# Patient Record
Sex: MALE | Race: WHITE | NOT HISPANIC OR LATINO | Employment: OTHER | URBAN - METROPOLITAN AREA
[De-identification: names, ages, dates, MRNs, and addresses within clinical notes are randomized per-mention and may not be internally consistent; named-entity substitution may affect disease eponyms.]

---

## 2017-09-20 ENCOUNTER — OFFICE VISIT (OUTPATIENT)
Dept: CARDIOLOGY | Facility: CLINIC | Age: 72
End: 2017-09-20

## 2017-09-20 VITALS
SYSTOLIC BLOOD PRESSURE: 124 MMHG | OXYGEN SATURATION: 100 % | HEIGHT: 73 IN | DIASTOLIC BLOOD PRESSURE: 82 MMHG | WEIGHT: 202 LBS | HEART RATE: 72 BPM | BODY MASS INDEX: 26.77 KG/M2

## 2017-09-20 DIAGNOSIS — I25.10 CORONARY ARTERY DISEASE INVOLVING NATIVE CORONARY ARTERY OF NATIVE HEART WITHOUT ANGINA PECTORIS: Primary | ICD-10-CM

## 2017-09-20 DIAGNOSIS — Z95.1 S/P CABG (CORONARY ARTERY BYPASS GRAFT): ICD-10-CM

## 2017-09-20 PROBLEM — E11.9 DIABETES (HCC): Status: ACTIVE | Noted: 2017-09-20

## 2017-09-20 PROBLEM — I10 BP (HIGH BLOOD PRESSURE): Status: ACTIVE | Noted: 2017-09-20

## 2017-09-20 PROBLEM — E78.5 HYPERLIPIDEMIA: Status: ACTIVE | Noted: 2017-09-20

## 2017-09-20 PROBLEM — K57.90 DIVERTICULOSIS: Status: ACTIVE | Noted: 2017-09-20

## 2017-09-20 PROBLEM — I10 HYPERTENSION: Status: ACTIVE | Noted: 2017-09-20

## 2017-09-20 PROBLEM — E11.9 DIABETES MELLITUS (HCC): Status: ACTIVE | Noted: 2017-09-20

## 2017-09-20 PROCEDURE — 93000 ELECTROCARDIOGRAM COMPLETE: CPT | Performed by: PHYSICIAN ASSISTANT

## 2017-09-20 PROCEDURE — 99213 OFFICE O/P EST LOW 20 MIN: CPT | Performed by: PHYSICIAN ASSISTANT

## 2017-09-20 NOTE — PROGRESS NOTES
Date of Office Visit: 2017  Encounter Provider: MAGGIE Nash  Place of Service: [facility]  Patient Name: Elias Raza  :1945    Chief Complaint   Patient presents with   • Coronary Artery Disease     1 year follow up   :     HPI: Elias Raza is a 72 y.o. male , new to me, who presents today for follow-up.  Old records have been obtained and reviewed by me.He is a patient of Dr. Maurice's with a past medical history significant for diabetes, hypertension, and coronary artery disease.  He is status post CABG ×6 in .  He also has a chronic right bundle branch block.  His last stress test was in , and showed no ischemia and a large inferior lateral infarct.  He was last in our office to see Dr. Maurice on 2016.  At that visit, he was doing well.  Dr. Maurice noted that the EF on the stress test was 30%, and he rechecked an echocardiogram for confirmation.  This showed normal LV function with an EF of 50% and no significant valvular abnormalities.  He is here today for yearly follow-up.   Over the past year he's been doing well.  He has a few episodes of chest pain, however this is stable for him.  He exercises 3 days a week doing weights and cardiovascular.  He is able to do this without difficulty.  He does not do much on his off days.  He eats a relatively healthy diet, and is compliant with his medications.    Past Medical History:   Diagnosis Date   • CAD (coronary artery disease)    • Diabetes mellitus    • Hypertension    • Old myocardial infarct        Past Surgical History:   Procedure Laterality Date   • CORONARY ARTERY BYPASS GRAFT  2009:LIMA to LAD, SVG to RI, SVG to OM2, SVG to RAY, SVG to RV and PDA; inflat MI no ischemia on stress in    • TOOTH EXTRACTION         Social History     Social History   • Marital status:      Spouse name: N/A   • Number of children: N/A   • Years of education: N/A      Occupational History   • Not on file.     Social History Main Topics   • Smoking status: Never Smoker   • Smokeless tobacco: Never Used   • Alcohol use No   • Drug use: No   • Sexual activity: Defer     Other Topics Concern   • Not on file     Social History Narrative       Family History   Problem Relation Age of Onset   • No Known Problems Mother    • No Known Problems Father    • No Known Problems Maternal Grandmother    • No Known Problems Maternal Grandfather    • No Known Problems Paternal Grandmother    • No Known Problems Paternal Grandfather        Review of Systems   Constitution: Negative for chills, fever, malaise/fatigue, weight gain and weight loss.   HENT: Negative for ear pain, hearing loss, nosebleeds and sore throat.    Eyes: Negative for double vision, pain and visual disturbance.   Cardiovascular: Positive for chest pain. Negative for dyspnea on exertion, irregular heartbeat, leg swelling, near-syncope, orthopnea, palpitations, paroxysmal nocturnal dyspnea and syncope.   Respiratory: Negative for cough, shortness of breath, sleep disturbances due to breathing, snoring and wheezing.    Endocrine: Negative for cold intolerance, heat intolerance and polyuria.   Skin: Negative for itching and rash.   Musculoskeletal: Negative for joint pain, joint swelling and myalgias.   Gastrointestinal: Negative for abdominal pain, diarrhea, melena, nausea and vomiting.   Genitourinary: Negative for frequency, hematuria and hesitancy.   Neurological: Negative for excessive daytime sleepiness, headaches, light-headedness, numbness, paresthesias and seizures.   Psychiatric/Behavioral: Negative for altered mental status and depression.   Allergic/Immunologic: Negative.    All other systems reviewed and are negative.      No Known Allergies      Current Outpatient Prescriptions:   •  aspirin 81 MG tablet, Take 81 mg by mouth Daily., Disp: , Rfl:   •  azelastine (ASTELIN) 0.1 % nasal spray, 2 sprays into each  "nostril Daily., Disp: , Rfl:   •  fluticasone (FLONASE) 50 MCG/ACT nasal spray, 2 sprays into each nostril Daily., Disp: , Rfl:   •  glipiZIDE (GLUCOTROL) 5 MG ER tablet, Take 5 mg by mouth 2 (Two) Times a Day., Disp: , Rfl:   •  lisinopril (PRINIVIL,ZESTRIL) 10 MG tablet, Take 1 tablet by mouth daily., Disp: 90 tablet, Rfl: 3  •  METFORMIN HCL ER PO, Take 1,000 mg by mouth 2 (two) times a day., Disp: , Rfl:   •  metoprolol tartrate (LOPRESSOR) 25 MG tablet, TAKE 1 TABLET BY MOUTH 2 (TWO) TIMES A DAY., Disp: 180 tablet, Rfl: 3  •  Multiple Vitamin tablet, Take 1 tablet by mouth Daily., Disp: , Rfl:   •  rosuvastatin (CRESTOR) 10 MG tablet, Take 10 mg by mouth Daily., Disp: , Rfl:      Objective:     Vitals:    09/20/17 0855 09/20/17 0919   BP: 120/80 124/82   BP Location: Right arm Left arm   Pulse: 72    SpO2: 100%    Weight: 202 lb (91.6 kg)    Height: 73\" (185.4 cm)      Body mass index is 26.65 kg/(m^2).    PHYSICAL EXAM:    Physical Exam   Constitutional: He is oriented to person, place, and time. He appears well-developed and well-nourished. No distress.   HENT:   Head: Normocephalic and atraumatic.   Eyes: Pupils are equal, round, and reactive to light.   Neck: No JVD present. No thyromegaly present.   Cardiovascular: Normal rate, regular rhythm, normal heart sounds and intact distal pulses.    No murmur heard.  Pulmonary/Chest: Effort normal and breath sounds normal. No respiratory distress.   Abdominal: Soft. Bowel sounds are normal. He exhibits no distension. There is no splenomegaly or hepatomegaly. There is no tenderness.   Musculoskeletal: Normal range of motion. He exhibits no edema.   Neurological: He is alert and oriented to person, place, and time.   Skin: Skin is warm and dry. He is not diaphoretic. No erythema.   Psychiatric: He has a normal mood and affect. His behavior is normal. Judgment normal.         ECG 12 Lead  Date/Time: 9/20/2017 9:31 AM  Performed by: CARL SHI  Authorized by: " CARL GANDHI   Comparison: compared with previous ECG from 8/29/2016  Similar to previous ECG  Rhythm: sinus rhythm  BPM: 72  Conduction: right bundle branch block  T depression: III and aVF  Q waves: III and aVF  Clinical impression: abnormal ECG  Comments: Indication: Coronary artery disease, status post CABG.              Assessment:       Diagnosis Plan   1. Coronary artery disease involving native coronary artery of native heart without angina pectoris  ECG 12 Lead   2. S/P CABG (coronary artery bypass graft)  ECG 12 Lead     Orders Placed This Encounter   Procedures   • ECG 12 Lead     This order was created via procedure documentation          Plan:       1.  Coronary Artery Disease  Assessment  • The patient has no angina    Plan  • Lifestyle modifications discussed include adhering to a heart healthy diet, medication compliance and regular exercise    Subjective - Objective  • There is a history of past MI  • There is a history of previous coronary artery bypass graft  • Current antiplatelet therapy includes aspirin 81 mg  • Overall he's doing great.  He has a few episodes of chest pain, however this is normal for him and stable.  He is able to exercise without difficulty.  I'm not going to make any changes to his medical regimen today.  He will follow-up with Dr. Maurice in 1 year or sooner if needed.      As always, it has been a pleasure to participate in your patient's care.      Sincerely,         Carl Gandhi PA-C

## 2017-11-10 RX ORDER — LISINOPRIL 10 MG/1
TABLET ORAL
Qty: 90 TABLET | Refills: 3 | Status: SHIPPED | OUTPATIENT
Start: 2017-11-10 | End: 2018-10-22 | Stop reason: SDUPTHER

## 2017-12-11 ENCOUNTER — HOSPITAL ENCOUNTER (EMERGENCY)
Facility: HOSPITAL | Age: 72
Discharge: HOME | End: 2017-12-11
Attending: EMERGENCY MEDICINE | Admitting: EMERGENCY MEDICINE

## 2017-12-11 ENCOUNTER — APPOINTMENT (OUTPATIENT)
Dept: GENERAL RADIOLOGY | Facility: HOSPITAL | Age: 72
End: 2017-12-11

## 2017-12-11 VITALS
HEART RATE: 78 BPM | DIASTOLIC BLOOD PRESSURE: 78 MMHG | RESPIRATION RATE: 16 BRPM | WEIGHT: 205 LBS | SYSTOLIC BLOOD PRESSURE: 150 MMHG | TEMPERATURE: 97.7 F | OXYGEN SATURATION: 94 % | BODY MASS INDEX: 27.17 KG/M2 | HEIGHT: 73 IN

## 2017-12-11 DIAGNOSIS — R07.89 ATYPICAL CHEST PAIN: Primary | ICD-10-CM

## 2017-12-11 LAB
ALBUMIN SERPL-MCNC: 4.2 G/DL (ref 3.5–5.2)
ALBUMIN/GLOB SERPL: 1.2 G/DL
ALP SERPL-CCNC: 84 U/L (ref 39–117)
ALT SERPL W P-5'-P-CCNC: 18 U/L (ref 1–41)
ANION GAP SERPL CALCULATED.3IONS-SCNC: 9.5 MMOL/L
AST SERPL-CCNC: 17 U/L (ref 1–40)
BASOPHILS # BLD AUTO: 0.02 10*3/MM3 (ref 0–0.2)
BASOPHILS NFR BLD AUTO: 0.2 % (ref 0–1.5)
BILIRUB SERPL-MCNC: 0.4 MG/DL (ref 0.1–1.2)
BUN BLD-MCNC: 16 MG/DL (ref 8–23)
BUN/CREAT SERPL: 15.2 (ref 7–25)
CALCIUM SPEC-SCNC: 9.3 MG/DL (ref 8.6–10.5)
CHLORIDE SERPL-SCNC: 102 MMOL/L (ref 98–107)
CO2 SERPL-SCNC: 28.5 MMOL/L (ref 22–29)
CREAT BLD-MCNC: 1.05 MG/DL (ref 0.76–1.27)
DEPRECATED RDW RBC AUTO: 45.4 FL (ref 37–54)
EOSINOPHIL # BLD AUTO: 0.17 10*3/MM3 (ref 0–0.7)
EOSINOPHIL NFR BLD AUTO: 1.9 % (ref 0.3–6.2)
ERYTHROCYTE [DISTWIDTH] IN BLOOD BY AUTOMATED COUNT: 13.2 % (ref 11.5–14.5)
GFR SERPL CREATININE-BSD FRML MDRD: 69 ML/MIN/1.73
GLOBULIN UR ELPH-MCNC: 3.4 GM/DL
GLUCOSE BLD-MCNC: 113 MG/DL (ref 65–99)
HCT VFR BLD AUTO: 47.5 % (ref 40.4–52.2)
HGB BLD-MCNC: 15.2 G/DL (ref 13.7–17.6)
HOLD SPECIMEN: NORMAL
IMM GRANULOCYTES # BLD: 0.03 10*3/MM3 (ref 0–0.03)
IMM GRANULOCYTES NFR BLD: 0.3 % (ref 0–0.5)
LYMPHOCYTES # BLD AUTO: 2.24 10*3/MM3 (ref 0.9–4.8)
LYMPHOCYTES NFR BLD AUTO: 25 % (ref 19.6–45.3)
MCH RBC QN AUTO: 30.5 PG (ref 27–32.7)
MCHC RBC AUTO-ENTMCNC: 32 G/DL (ref 32.6–36.4)
MCV RBC AUTO: 95.4 FL (ref 79.8–96.2)
MONOCYTES # BLD AUTO: 0.69 10*3/MM3 (ref 0.2–1.2)
MONOCYTES NFR BLD AUTO: 7.7 % (ref 5–12)
NEUTROPHILS # BLD AUTO: 5.82 10*3/MM3 (ref 1.9–8.1)
NEUTROPHILS NFR BLD AUTO: 64.9 % (ref 42.7–76)
PLATELET # BLD AUTO: 221 10*3/MM3 (ref 140–500)
PMV BLD AUTO: 10.7 FL (ref 6–12)
POTASSIUM BLD-SCNC: 4.7 MMOL/L (ref 3.5–5.2)
PROT SERPL-MCNC: 7.6 G/DL (ref 6–8.5)
RBC # BLD AUTO: 4.98 10*6/MM3 (ref 4.6–6)
SODIUM BLD-SCNC: 140 MMOL/L (ref 136–145)
TROPONIN T SERPL-MCNC: <0.01 NG/ML (ref 0–0.03)
TROPONIN T SERPL-MCNC: <0.01 NG/ML (ref 0–0.03)
WBC NRBC COR # BLD: 8.97 10*3/MM3 (ref 4.5–10.7)
WHOLE BLOOD HOLD SPECIMEN: NORMAL

## 2017-12-11 PROCEDURE — 71020 HC CHEST PA AND LATERAL: CPT

## 2017-12-11 PROCEDURE — 93005 ELECTROCARDIOGRAM TRACING: CPT

## 2017-12-11 PROCEDURE — 84484 ASSAY OF TROPONIN QUANT: CPT | Performed by: EMERGENCY MEDICINE

## 2017-12-11 PROCEDURE — 85025 COMPLETE CBC W/AUTO DIFF WBC: CPT | Performed by: EMERGENCY MEDICINE

## 2017-12-11 PROCEDURE — 80053 COMPREHEN METABOLIC PANEL: CPT | Performed by: EMERGENCY MEDICINE

## 2017-12-11 PROCEDURE — 36415 COLL VENOUS BLD VENIPUNCTURE: CPT | Performed by: EMERGENCY MEDICINE

## 2017-12-11 PROCEDURE — 93010 ELECTROCARDIOGRAM REPORT: CPT | Performed by: INTERNAL MEDICINE

## 2017-12-11 PROCEDURE — 99283 EMERGENCY DEPT VISIT LOW MDM: CPT

## 2017-12-11 RX ORDER — ASPIRIN 325 MG
325 TABLET ORAL ONCE
Status: DISCONTINUED | OUTPATIENT
Start: 2017-12-11 | End: 2017-12-11

## 2017-12-11 RX ORDER — SODIUM CHLORIDE 0.9 % (FLUSH) 0.9 %
10 SYRINGE (ML) INJECTION AS NEEDED
Status: DISCONTINUED | OUTPATIENT
Start: 2017-12-11 | End: 2017-12-11 | Stop reason: HOSPADM

## 2017-12-11 NOTE — ED PROVIDER NOTES
EMERGENCY DEPARTMENT ENCOUNTER    CHIEF COMPLAINT  Chief Complaint: Chest Pain  History given by: Patient  History limited by:   Room Number: 17/17  PMD: Sb Del Rio DO  Cardiologist: Dr. Maurice    HPI:  Pt is a 72 y.o. male who presents complaining of intermittent chest pain that onset 3 days ago. Pt reports the pain is in the mid-back and radiates to the lower chest. He has taken ASA without relief. Pt has a hx of CAD and previous CABG. He has had similar CP in the past that resolved. He denies any recent illness, fever, chills, abd pain, N/V, diaphoresis, or SOA. He states that pain is worse in certain positions.    Duration: 3 days  Onset: sudden  Timing: constant  Location: mid-back  Radiation: lower chest  Quality: soreness  Intensity/Severity: moderate  Progression: unchanged  Associated Symptoms: none  Aggravating Factors: certain positions  Alleviating Factors: none  Previous Episodes: hx of CAD and CABG. Similar pain has normally resolved  Treatment before arrival: took ASA without relief    PAST MEDICAL HISTORY  Active Ambulatory Problems     Diagnosis Date Noted   • Old inferior wall myocardial infarction 08/29/2016   • RBBB (right bundle branch block) 08/29/2016   • CAD (coronary artery disease) 08/29/2016   • S/P CABG (coronary artery bypass graft) 08/29/2016   • Abnormal EKG 08/09/2013   • Carotid artery stenosis 08/09/2013   • Diabetes 09/20/2017   • Diabetes mellitus 09/20/2017   • Diverticulosis 09/20/2017   • BP (high blood pressure) 09/20/2017   • Hyperlipidemia 09/20/2017   • Hypertension 09/20/2017   • PVC (premature ventricular contraction) 08/09/2013   • Type 2 diabetes mellitus with hemoglobin A1c goal of less than 7.0% 12/18/2015     Resolved Ambulatory Problems     Diagnosis Date Noted   • No Resolved Ambulatory Problems     Past Medical History:   Diagnosis Date   • CAD (coronary artery disease)    • Diabetes mellitus    • Hypertension    • Old myocardial infarct        PAST SURGICAL  HISTORY  Past Surgical History:   Procedure Laterality Date   • CORONARY ARTERY BYPASS GRAFT  2009    2009:LIMA to LAD, SVG to RI, SVG to OM2, SVG to RAY, SVG to RV and PDA; inflat MI no ischemia on stress in 2015   • TOOTH EXTRACTION         FAMILY HISTORY  Family History   Problem Relation Age of Onset   • No Known Problems Mother    • No Known Problems Father    • No Known Problems Maternal Grandmother    • No Known Problems Maternal Grandfather    • No Known Problems Paternal Grandmother    • No Known Problems Paternal Grandfather        SOCIAL HISTORY  Social History     Social History   • Marital status:      Spouse name: N/A   • Number of children: N/A   • Years of education: N/A     Occupational History   • Not on file.     Social History Main Topics   • Smoking status: Never Smoker   • Smokeless tobacco: Never Used   • Alcohol use No   • Drug use: No   • Sexual activity: Defer     Other Topics Concern   • Not on file     Social History Narrative       ALLERGIES  Review of patient's allergies indicates no known allergies.    REVIEW OF SYSTEMS  Review of Systems   Constitutional: Negative for activity change, appetite change and fever.   HENT: Negative for congestion and sore throat.    Eyes: Negative.    Respiratory: Negative for cough and shortness of breath.    Cardiovascular: Positive for chest pain. Negative for leg swelling.   Gastrointestinal: Negative for abdominal pain, diarrhea and vomiting.   Endocrine: Negative.    Genitourinary: Negative for decreased urine volume and dysuria.   Musculoskeletal: Negative for neck pain.   Skin: Negative for rash and wound.   Allergic/Immunologic: Negative.    Neurological: Negative for weakness, numbness and headaches.   Hematological: Negative.    Psychiatric/Behavioral: Negative.    All other systems reviewed and are negative.      PHYSICAL EXAM  ED Triage Vitals   Temp Heart Rate Resp BP SpO2   12/11/17 1252 12/11/17 1252 12/11/17 1255 12/11/17 1258  12/11/17 1252   97.7 °F (36.5 °C) 90 16 182/80 98 %      Temp src Heart Rate Source Patient Position BP Location FiO2 (%)   12/11/17 1252 12/11/17 1252 12/11/17 1258 12/11/17 1258 --   Tympanic Monitor Sitting Right arm        Physical Exam   Constitutional: He is oriented to person, place, and time and well-developed, well-nourished, and in no distress.   HENT:   Head: Normocephalic and atraumatic.   Eyes: EOM are normal. Pupils are equal, round, and reactive to light.   Neck: Normal range of motion. Neck supple.   Cardiovascular: Normal rate, regular rhythm and normal heart sounds.    Pulmonary/Chest: Effort normal and breath sounds normal. No respiratory distress.   Abdominal: Soft. There is no tenderness. There is no rebound and no guarding.   Musculoskeletal: Normal range of motion. He exhibits no edema.   Neurological: He is alert and oriented to person, place, and time. He has normal sensation and normal strength.   Skin: Skin is warm and dry.   Psychiatric: Mood and affect normal.   Nursing note and vitals reviewed.      LAB RESULTS  Lab Results (last 24 hours)     Procedure Component Value Units Date/Time    CBC & Differential [[id number]] Collected:  12/11/17 1513    Specimen:  Blood Updated:  12/11/17 1609    Narrative:       The following orders were created for panel order CBC & Differential.  Procedure                               Abnormality         Status                     ---------                               -----------         ------                     CBC Auto Differential[[id number]]        Abnormal            Final result                 Please view results for these tests on the individual orders.    Comprehensive Metabolic Panel [[id number]]  (Abnormal) Collected:  12/11/17 1513    Specimen:  Blood Updated:  12/11/17 1619     Glucose 113 (H) mg/dL      BUN 16 mg/dL      Creatinine 1.05 mg/dL      Sodium 140 mmol/L      Potassium 4.7 mmol/L      Chloride 102 mmol/L      CO2 28.5 mmol/L       Calcium 9.3 mg/dL      Total Protein 7.6 g/dL      Albumin 4.20 g/dL      ALT (SGPT) 18 U/L      AST (SGOT) 17 U/L      Alkaline Phosphatase 84 U/L      Total Bilirubin 0.4 mg/dL      eGFR Non African Amer 69 mL/min/1.73      Globulin 3.4 gm/dL      A/G Ratio 1.2 g/dL      BUN/Creatinine Ratio 15.2     Anion Gap 9.5 mmol/L     Narrative:       The MDRD GFR formula is only valid for adults with stable renal function between ages 18 and 70.    Troponin [[id number]]  (Normal) Collected:  12/11/17 1513    Specimen:  Blood Updated:  12/11/17 1619     Troponin T <0.010 ng/mL     Narrative:       Troponin T Reference Ranges:  Less than 0.03 ng/mL:    Negative for AMI  0.03 to 0.09 ng/mL:      Indeterminant for AMI  Greater than 0.09 ng/mL: Positive for AMI    CBC Auto Differential [[id number]]  (Abnormal) Collected:  12/11/17 1513    Specimen:  Blood Updated:  12/11/17 1609     WBC 8.97 10*3/mm3      RBC 4.98 10*6/mm3      Hemoglobin 15.2 g/dL      Hematocrit 47.5 %      MCV 95.4 fL      MCH 30.5 pg      MCHC 32.0 (L) g/dL      RDW 13.2 %      RDW-SD 45.4 fl      MPV 10.7 fL      Platelets 221 10*3/mm3      Neutrophil % 64.9 %      Lymphocyte % 25.0 %      Monocyte % 7.7 %      Eosinophil % 1.9 %      Basophil % 0.2 %      Immature Grans % 0.3 %      Neutrophils, Absolute 5.82 10*3/mm3      Lymphocytes, Absolute 2.24 10*3/mm3      Monocytes, Absolute 0.69 10*3/mm3      Eosinophils, Absolute 0.17 10*3/mm3      Basophils, Absolute 0.02 10*3/mm3      Immature Grans, Absolute 0.03 10*3/mm3     Troponin [[id number]]  (Normal) Collected:  12/11/17 1723    Specimen:  Blood from Arm, Left Updated:  12/11/17 1758     Troponin T <0.010 ng/mL     Narrative:       Troponin T Reference Ranges:  Less than 0.03 ng/mL:    Negative for AMI  0.03 to 0.09 ng/mL:      Indeterminant for AMI  Greater than 0.09 ng/mL: Positive for AMI          I ordered the above labs and reviewed the results    RADIOLOGY  XR Chest 2 View   Preliminary result by  Interface, Rad Results [place] In (12/11/17 16:32:30)     Narrative:     TWO-VIEW CHEST     HISTORY: Anterior chest pain and shortness of breath.     FINDINGS:  There is some vague haziness in the lower left chest in part  related to a small amount of pleural fluid extending along the left  lateral chest wall and combined with possible developing infiltrate that  is new since the study of 10/19/2011. The heart remains slightly  enlarged with sternal wires from previous cardiac surgery.                   I ordered the above noted radiological studies. Interpreted by radiologist. Reviewed by me in PACS.       PROCEDURES  Procedures  EKG           EKG time: 1256  Rhythm/Rate: NSR, 85BPM  P waves and CT: nml  QRS, axis: nml axis, RBBB   ST and T waves: nml     Interpreted Contemporaneously by me, independently viewed  unchanged compared to prior 9/19/11      PROGRESS AND CONSULTS  ED Course   6:00 PM  Rechecked with pt. Informed the pt of his negative repeat troponin and plan to discharge. Instructed to follow-up with cardiologist for further evaluation if needed. Pt understands and agrees with plan. All concerns addressed.         MEDICAL DECISION MAKING  Results were reviewed/discussed with the patient and they were also made aware of online access. Pt also made aware that some labs, such as cultures, will not be resulted during ER visit and follow up with PMD is necessary.     MDM  Number of Diagnoses or Management Options     Amount and/or Complexity of Data Reviewed  Clinical lab tests: ordered and reviewed  Tests in the radiology section of CPT®: ordered and reviewed  Tests in the medicine section of CPT®: reviewed and ordered (EKG - See EKG procedure note)  Review and summarize past medical records: yes (Seen by cardiologist 11/20/17 for yearly follow-up. No changes at that time. )           DIAGNOSIS  Final diagnoses:   Atypical chest pain       DISPOSITION  DISCHARGE    Patient discharged in stable  condition.    Reviewed implications of results, diagnosis, meds, responsibility to follow up, warning signs and symptoms of possible worsening, potential complications and reasons to return to ER.    Patient/Family voiced understanding of above instructions.    Discussed plan for discharge, as there is no emergent indication for admission.  Pt/family is agreeable and understands need for follow up and repeat testing.  Pt is aware that discharge does not mean that nothing is wrong but it indicates no emergency is present that requires admission and they must continue care with follow-up as given below or physician of their choice.     FOLLOW-UP  Stanislav Maurice MD  [address] [id number] [phone]    Call in 1 day  For cardiologist follow-up, If symptoms worsen         Medication List      Notice     No changes were made to your prescriptions during this visit.            Latest Documented Vital Signs:  As of 6:02 PM  BP- 177/76 HR- 84 Temp- 97.7 °F (36.5 °C) (Tympanic) O2 sat- 96%    --  Documentation assistance provided by kt Guzmán for Dr. Hurtado.  Information recorded by the scribe was done at my direction and has been verified and validated by me.     Lei Guzmán  12/11/17 8897       Cale Hurtado MD  12/11/17 9222

## 2018-09-21 ENCOUNTER — OFFICE VISIT (OUTPATIENT)
Dept: CARDIOLOGY | Facility: CLINIC | Age: 73
End: 2018-09-21

## 2018-09-21 VITALS
BODY MASS INDEX: 25.84 KG/M2 | WEIGHT: 195 LBS | DIASTOLIC BLOOD PRESSURE: 88 MMHG | SYSTOLIC BLOOD PRESSURE: 144 MMHG | HEART RATE: 72 BPM | HEIGHT: 73 IN

## 2018-09-21 DIAGNOSIS — I45.10 RBBB (RIGHT BUNDLE BRANCH BLOCK): ICD-10-CM

## 2018-09-21 DIAGNOSIS — I10 HYPERTENSION, UNSPECIFIED TYPE: ICD-10-CM

## 2018-09-21 DIAGNOSIS — Z95.1 S/P CABG (CORONARY ARTERY BYPASS GRAFT): ICD-10-CM

## 2018-09-21 DIAGNOSIS — E78.5 HYPERLIPIDEMIA, UNSPECIFIED HYPERLIPIDEMIA TYPE: ICD-10-CM

## 2018-09-21 DIAGNOSIS — I25.2 OLD INFERIOR WALL MYOCARDIAL INFARCTION: ICD-10-CM

## 2018-09-21 DIAGNOSIS — I25.10 CORONARY ARTERY DISEASE INVOLVING NATIVE CORONARY ARTERY OF NATIVE HEART WITHOUT ANGINA PECTORIS: Primary | ICD-10-CM

## 2018-09-21 PROBLEM — R80.1 PERSISTENT PROTEINURIA: Status: ACTIVE | Noted: 2018-05-24

## 2018-09-21 PROBLEM — Z12.11 SCREEN FOR COLON CANCER: Status: ACTIVE | Noted: 2018-02-19

## 2018-09-21 PROBLEM — Z86.010 HX OF COLONIC POLYPS: Status: ACTIVE | Noted: 2018-02-19

## 2018-09-21 PROBLEM — E11.21 DIABETIC NEPHROPATHY ASSOCIATED WITH TYPE 2 DIABETES MELLITUS (HCC): Status: ACTIVE | Noted: 2018-08-23

## 2018-09-21 PROBLEM — Z86.0100 HX OF COLONIC POLYPS: Status: ACTIVE | Noted: 2018-02-19

## 2018-09-21 PROBLEM — J30.1 SEASONAL ALLERGIC RHINITIS DUE TO POLLEN: Status: ACTIVE | Noted: 2018-05-24

## 2018-09-21 PROCEDURE — 93000 ELECTROCARDIOGRAM COMPLETE: CPT | Performed by: NURSE PRACTITIONER

## 2018-09-21 PROCEDURE — 99214 OFFICE O/P EST MOD 30 MIN: CPT | Performed by: NURSE PRACTITIONER

## 2018-09-21 RX ORDER — LANCETS
EACH MISCELLANEOUS
COMMUNITY
Start: 2017-09-25

## 2018-09-21 RX ORDER — AZELASTINE 1 MG/ML
SPRAY, METERED NASAL
COMMUNITY
Start: 2018-05-29 | End: 2020-09-22 | Stop reason: SDUPTHER

## 2018-09-21 RX ORDER — GLIPIZIDE 5 MG/1
5 TABLET, FILM COATED, EXTENDED RELEASE ORAL
COMMUNITY
Start: 2018-08-23

## 2018-09-21 RX ORDER — ROSUVASTATIN CALCIUM 10 MG/1
TABLET, COATED ORAL
COMMUNITY
Start: 2018-07-30

## 2018-09-21 NOTE — PROGRESS NOTES
Date of Office Visit: 2018  Encounter Provider: ARLIN Ford  Place of Service: [facility] CARDIOLOGY  Patient Name: Elias Raza  :1945      Subjective:     Chief Complaint: CAD, HTN    History of Present Illness:  Elias Raza is a pleasant 73 y.o. male who is new to me.  Outside records have been obtained and reviewed by me.  The patient has a past medical history of coronary artery disease, old myocardial infarct, coronary artery bypass grafting x 6 in , hypertension, chronic RBBB, and diabetes.      His last stress test was in  and showed no ischemia and a large inferior lateral infarct.  When he last saw Dr. Maurice on 16 Dr. Maurice noted that his ejection fraction on a stress test was 30% and rechecked an echocardiogram for confirmation.  This echo showed normal LV function with an EF of 50% and no significant valvular abnormalities.     He was last seen in the office by Bibi Morel on 17 for a yearly follow-up.  This visit he had overall been doing pretty well.  He has had a few episodes of chest pain, however this was stable for him.  He reports that he exercises 3 days a week doing weights and cardiovascular exercise and is able to do this without difficulty.  He does not do much on his off days.  He stated at that time that he eats a relatively healthy diet, and is compliant with his medications.    The patient presented to the emergency department on 17 with intermittent chest pain which started about 3 days prior.  The patient reports the pain was in the mid back and radiates to the lower chest.  He has taken aspirin without relief.  Has had similar chest pain in the past that have resolved.  He states that the pain is worse in certain positions.  He denied any recent illnesses, fever, chills and abdominal pain, nausea or vomiting, diaphoresis or shortness of breath.  The patient had 2 negative troponins, his EKG showed  normal sinus rhythm with a right bundle branch block.  He was discharged home and advised to follow-up with his cardiologist, which I do not see where he followed up with us after this episode.  He was a little hypertensive in the ED with a BP of 177-182/76-80.  All other vital signs appeared stable.  His labs were essentially normal ride from a mildly elevated glucose level of 113.      His CMP on 17 was within normal limits aside from a slightly elevated glucose level of 113 (99). LFTs were WNL. His last lipid panel I see was 4/10/17: His LDL was 38, HDL 35 and triglycerides were elevated at 180, and Total cholesterol was 119.      Her is here today for his yearly follow-up. Overall the patient reports he is feeling well. He reports that every once in a while he gets chest discomfort which is described as a shooting pain that is aggravated by heavy lifting or heavy yard work that lasts only a couple seconds. He gets it in both arms occasionally and sometimes it rotates between arms.  He reports he goes to the gym 3 times a week and does an hour of light cardio getting his HR up to about 110s to 115s  and some lifting.  He does not have any chest pain or shortness of breath with his gym activities. He also reports some mild dizziness with fast movements.  He denies any syncope, near syncope or falls.  He denies any PND, orthopnea or swelling of the legs.  He reports that he does not check his blood pressure at home. He does state that his AM blood sugars have for the most part been running WNL 90-100s.    Procedures/Testin16 Echo    All left ventricular wall segments contract normally.  Left ventricular function is normal. Estimated EF = 50%.  Left ventricular diastolic dysfunction (grade I) consistent with impaired relaxation.  The aortic valve is abnormal in structure. There is calcification of the noncoronary cusp present. Decreased movement. No aortic valve regurgitation is present. No aortic  valve stenosis is present.      Past Medical History:   Diagnosis Date   • CAD (coronary artery disease)    • Diabetes mellitus (CMS/HCC)    • Hypertension    • Old myocardial infarct      Past Surgical History:   Procedure Laterality Date   • CORONARY ARTERY BYPASS GRAFT  2009 2009:LIMA to LAD, SVG to RI, SVG to OM2, SVG to RAY, SVG to RV and PDA; inflat MI no ischemia on stress in 2015   • TOOTH EXTRACTION       Outpatient Medications Prior to Visit   Medication Sig Dispense Refill   • aspirin 81 MG tablet Take 81 mg by mouth Daily.     • azelastine (ASTELIN) 0.1 % nasal spray 2 sprays into each nostril Daily.     • fluticasone (FLONASE) 50 MCG/ACT nasal spray 2 sprays into each nostril Daily.     • glipiZIDE (GLUCOTROL) 5 MG ER tablet Take 5 mg by mouth Daily.     • lisinopril (PRINIVIL,ZESTRIL) 10 MG tablet TAKE 1 TABLET BY MOUTH DAILY. 90 tablet 3   • METFORMIN HCL ER PO Take 1,000 mg by mouth 2 (two) times a day.     • metoprolol tartrate (LOPRESSOR) 25 MG tablet TAKE 1 TABLET BY MOUTH 2 (TWO) TIMES A DAY. 180 tablet 0   • Multiple Vitamin tablet Take 1 tablet by mouth Daily.     • rosuvastatin (CRESTOR) 10 MG tablet Take 10 mg by mouth Daily.     • azithromycin (ZITHROMAX Z-FLAQUITA) 250 MG tablet Take 2 tablets at the same time on Day 1 and then 1 tablet every 24 hours thereafter. 6 tablet 0     No facility-administered medications prior to visit.        Allergies as of 09/21/2018   • (No Known Allergies)     Social History     Social History   • Marital status:      Spouse name: N/A   • Number of children: N/A   • Years of education: N/A     Occupational History   • Not on file.     Social History Main Topics   • Smoking status: Former Smoker   • Smokeless tobacco: Never Used      Comment: 20 years ago   • Alcohol use No   • Drug use: No   • Sexual activity: Defer     Other Topics Concern   • Not on file     Social History Narrative   • No narrative on file     Family History   Problem Relation Age of  "Onset   • No Known Problems Mother    • No Known Problems Father    • No Known Problems Maternal Grandmother    • No Known Problems Maternal Grandfather    • No Known Problems Paternal Grandmother    • No Known Problems Paternal Grandfather      Review of Systems   Constitution: Negative for chills, fever and malaise/fatigue.   HENT: Negative for ear pain, hearing loss, nosebleeds and sore throat.    Eyes: Negative for double vision, pain, vision loss in left eye and vision loss in right eye.   Cardiovascular: Negative for chest pain, claudication, dyspnea on exertion, irregular heartbeat, leg swelling, near-syncope, orthopnea, palpitations, paroxysmal nocturnal dyspnea and syncope.   Respiratory: Negative for cough, shortness of breath, snoring and wheezing.    Endocrine: Negative for cold intolerance and heat intolerance.   Hematologic/Lymphatic: Negative for bleeding problem.   Skin: Negative for color change, itching, rash and unusual hair distribution.   Musculoskeletal: Negative for joint pain and joint swelling.   Gastrointestinal: Negative for abdominal pain, diarrhea, hematochezia, melena, nausea and vomiting.   Genitourinary: Negative for decreased libido, frequency, hematuria, hesitancy and incomplete emptying.   Neurological: Positive for dizziness (occassional with fast movement). Negative for excessive daytime sleepiness, headaches, light-headedness, loss of balance, numbness, paresthesias and seizures.   Psychiatric/Behavioral: Negative for depression.          Objective:     Vitals:    09/21/18 0956   BP: 144/88   BP Location: Left arm   Patient Position: Sitting   Pulse: 72   Weight: 88.5 kg (195 lb)   Height: 185.4 cm (73\")     Body mass index is 25.73 kg/m².    PHYSICAL EXAM:  Physical Exam   Constitutional: He is oriented to person, place, and time. He appears well-developed and well-nourished. No distress.   HENT:   Head: Normocephalic and atraumatic.   Eyes: Pupils are equal, round, and " reactive to light.   Neck: Neck supple. No JVD present. Carotid bruit is not present.   Cardiovascular: Normal rate, regular rhythm, normal heart sounds and intact distal pulses.    No murmur heard.  Pulses:       Radial pulses are 2+ on the right side, and 2+ on the left side.        Dorsalis pedis pulses are 2+ on the right side, and 2+ on the left side.        Posterior tibial pulses are 2+ on the right side, and 2+ on the left side.   Pulmonary/Chest: Effort normal and breath sounds normal. No accessory muscle usage. No respiratory distress. He has no decreased breath sounds. He has no wheezes. He has no rhonchi. He has no rales. He exhibits no tenderness.   Abdominal: Soft. Bowel sounds are normal. He exhibits no distension. There is no splenomegaly or hepatomegaly. There is no tenderness.   Musculoskeletal: Normal range of motion. He exhibits no edema.   Neurological: He is alert and oriented to person, place, and time.   Skin: Skin is warm, dry and intact. He is not diaphoretic. No erythema.   Psychiatric: He has a normal mood and affect. His speech is normal and behavior is normal. Judgment and thought content normal. Cognition and memory are normal.   Nursing note and vitals reviewed.        ECG 12 Lead  Date/Time: 9/21/2018 10:11 AM  Performed by: BASILIA GARCIA  Authorized by: BASILIA GARCIA   Comparison: compared with previous ECG from 9/20/2017  Similar to previous ECG  Rhythm: sinus rhythm  Rate: normal  BPM: 72  Conduction: right bundle branch block  Clinical impression: abnormal ECG  Comments: Probable inferior infarct: age indeterminant- negative T -waves and Q waves in leads II, III and aVF  Indication: CAD              Assessment:       Diagnosis Plan   1. Coronary artery disease involving native coronary artery of native heart without angina pectoris  Stress Test With Myocardial Perfusion One Day   2. S/P CABG (coronary artery bypass graft)  Stress Test With Myocardial Perfusion One Day   3.  Old inferior wall myocardial infarction  Stress Test With Myocardial Perfusion One Day   4. RBBB (right bundle branch block)  Stress Test With Myocardial Perfusion One Day   5. Hyperlipidemia, unspecified hyperlipidemia type  Stress Test With Myocardial Perfusion One Day   6. Hypertension, unspecified type  Stress Test With Myocardial Perfusion One Day       Plan:     1.  Coronary Artery Disease  Assessment  • The patient has no angina  • S/p CABG x 6 in 2009    Subjective - Objective  • Current antiplatelet therapy includes aspirin 81 mg    s/p CABG x 6 in 2009.  His last stress test was in 2015.  We will repeat another next year    2. Hypertension: His b/p today is 144/88.  He reports occasional dizziness with fast movements. He does not check his blood pressure at home like he used to. He is on Metoprolol tartrate 25 mg twice daily, and Lisinopril 10 mg daily. I advised him to start checking his b/p at home to make sure it is not getting too high or low causing his dizziness. He will let us know if he has further issues.    3. Hyperlipidemia:  His LDL was 38, HDL 35 and triglycerides were elevated at 180, and Total cholesterol was 119.  On Crestor 10 mg daily.  He reports he had his cholesterol checked this year by his PCP and was told it was ok. I will defer to PCP for monitoring of lipid panel and titration of statin.    4. Right bundle branch block: Chronic    I advised on the importance of good blood pressure cholesterol and blood sugar control and for cardiovascular disease risk factor modification.       Follow up with Dr. Maurice in 1 year, unless otherwise needed sooner.  I advised the patient to contact our office with any questions or concerns.         Your medication list          Accurate as of 9/21/18 11:54 AM. If you have any questions, ask your nurse or doctor.               CONTINUE taking these medications      Instructions Last Dose Given Next Dose Due   ACCU-CHEK FASTCLIX LANCETS misc      Check  blood sugar daily fasting in a.m.       aspirin 81 MG tablet      Take 81 mg by mouth Daily.       azelastine 0.1 % nasal spray  Commonly known as:  ASTELIN      2 sprays into each nostril Daily.       azelastine 0.1 % nasal spray  Commonly known as:  ASTELIN      INSTILL 2 SPRAYS INTO EACH NOSTRIL TWICE A DAY       CRESTOR 10 MG tablet  Generic drug:  rosuvastatin      Take 10 mg by mouth Daily.       rosuvastatin 10 MG tablet  Commonly known as:  CRESTOR      TAKE 1 TABLET BY MOUTH DAILY       fluticasone 50 MCG/ACT nasal spray  Commonly known as:  FLONASE      2 sprays into each nostril Daily.       glipiZIDE 5 MG ER tablet  Commonly known as:  GLUCOTROL XL      Take 5 mg by mouth Daily.       glipiZIDE 5 MG ER tablet  Commonly known as:  GLUCOTROL XL      Take 5 mg by mouth.       lisinopril 10 MG tablet  Commonly known as:  PRINIVIL,ZESTRIL      TAKE 1 TABLET BY MOUTH DAILY.       METFORMIN HCL ER PO      Take 1,000 mg by mouth 2 (two) times a day.       metFORMIN 1000 MG tablet  Commonly known as:  GLUCOPHAGE      Take 1,000 mg by mouth.       metFORMIN 1000 MG tablet  Commonly known as:  GLUCOPHAGE           metoprolol tartrate 25 MG tablet  Commonly known as:  LOPRESSOR      TAKE 1 TABLET BY MOUTH 2 (TWO) TIMES A DAY.       Multiple Vitamin tablet      Take 1 tablet by mouth Daily.       ONETOUCH VERIO test strip  Generic drug:  glucose blood      1 strip by Other route.          STOP taking these medications    azithromycin 250 MG tablet  Commonly known as:  ZITHROMAX Z-FLAQUITA  Stopped by:  ARLIN Ford               The above medication changes may not have been made by this provider.  Medication list was updated to reflect medications patient is currently taking including medication changes and discontinuations made by other healthcare providers.       I have reviewed this case with Dr. Maurice. The patient was also evaluated by Dr. Maurice today as well. It has been a pleasure to participate in this  patient's care. Please feel free to contact me with any questions or concerns.     Carmita Guan, APRN  09/21/2018       Dictated utilizing Dragon Dictation System.

## 2018-10-22 RX ORDER — LISINOPRIL 10 MG/1
10 TABLET ORAL DAILY
Qty: 90 TABLET | Refills: 3 | Status: SHIPPED | OUTPATIENT
Start: 2018-10-22 | End: 2019-09-20 | Stop reason: SDUPTHER

## 2019-09-20 ENCOUNTER — HOSPITAL ENCOUNTER (OUTPATIENT)
Dept: CARDIOLOGY | Facility: HOSPITAL | Age: 74
Discharge: HOME | End: 2019-09-20
Admitting: NURSE PRACTITIONER

## 2019-09-20 ENCOUNTER — OFFICE VISIT (OUTPATIENT)
Dept: CARDIOLOGY | Facility: CLINIC | Age: 74
End: 2019-09-20

## 2019-09-20 VITALS — BODY MASS INDEX: 25.19 KG/M2 | HEIGHT: 73 IN | WEIGHT: 190.04 LBS

## 2019-09-20 VITALS
BODY MASS INDEX: 25.23 KG/M2 | HEIGHT: 73 IN | SYSTOLIC BLOOD PRESSURE: 130 MMHG | DIASTOLIC BLOOD PRESSURE: 82 MMHG | WEIGHT: 190.4 LBS | HEART RATE: 74 BPM

## 2019-09-20 DIAGNOSIS — I10 ESSENTIAL HYPERTENSION: ICD-10-CM

## 2019-09-20 DIAGNOSIS — E78.5 HYPERLIPIDEMIA, UNSPECIFIED HYPERLIPIDEMIA TYPE: ICD-10-CM

## 2019-09-20 DIAGNOSIS — E11.21 DIABETIC NEPHROPATHY ASSOCIATED WITH TYPE 2 DIABETES MELLITUS (HCC): ICD-10-CM

## 2019-09-20 DIAGNOSIS — I45.10 RBBB (RIGHT BUNDLE BRANCH BLOCK): ICD-10-CM

## 2019-09-20 DIAGNOSIS — I25.2 OLD INFERIOR WALL MYOCARDIAL INFARCTION: Primary | ICD-10-CM

## 2019-09-20 DIAGNOSIS — I10 HYPERTENSION, UNSPECIFIED TYPE: ICD-10-CM

## 2019-09-20 DIAGNOSIS — I25.10 CORONARY ARTERY DISEASE INVOLVING NATIVE CORONARY ARTERY OF NATIVE HEART WITHOUT ANGINA PECTORIS: ICD-10-CM

## 2019-09-20 DIAGNOSIS — Z95.1 S/P CABG (CORONARY ARTERY BYPASS GRAFT): ICD-10-CM

## 2019-09-20 DIAGNOSIS — I25.2 OLD INFERIOR WALL MYOCARDIAL INFARCTION: ICD-10-CM

## 2019-09-20 LAB
BH CV NUCLEAR PRIOR STUDY: 2
BH CV STRESS BP STAGE 1: NORMAL
BH CV STRESS DURATION MIN STAGE 1: 3
BH CV STRESS DURATION MIN STAGE 2: 1
BH CV STRESS DURATION SEC STAGE 1: 0
BH CV STRESS DURATION SEC STAGE 2: 30
BH CV STRESS GRADE STAGE 1: 10
BH CV STRESS GRADE STAGE 2: 12
BH CV STRESS HR STAGE 1: 129
BH CV STRESS HR STAGE 2: 138
BH CV STRESS METS STAGE 1: 5
BH CV STRESS METS STAGE 2: 7.5
BH CV STRESS PROTOCOL 1: NORMAL
BH CV STRESS RECOVERY BP: NORMAL MMHG
BH CV STRESS RECOVERY HR: 80 BPM
BH CV STRESS SPEED STAGE 1: 1.7
BH CV STRESS SPEED STAGE 2: 2.5
BH CV STRESS STAGE 1: 1
BH CV STRESS STAGE 2: 2
LV EF NUC BP: 50 %
MAXIMAL PREDICTED HEART RATE: 144 BPM
PERCENT MAX PREDICTED HR: 95.83 %
STRESS BASELINE BP: NORMAL MMHG
STRESS BASELINE HR: 74 BPM
STRESS PERCENT HR: 113 %
STRESS POST ESTIMATED WORKLOAD: 6 METS
STRESS POST EXERCISE DUR MIN: 4 MIN
STRESS POST EXERCISE DUR SEC: 30 SEC
STRESS POST PEAK BP: NORMAL MMHG
STRESS POST PEAK HR: 138 BPM
STRESS TARGET HR: 122 BPM

## 2019-09-20 PROCEDURE — 99214 OFFICE O/P EST MOD 30 MIN: CPT | Performed by: INTERNAL MEDICINE

## 2019-09-20 PROCEDURE — 78452 HT MUSCLE IMAGE SPECT MULT: CPT | Performed by: INTERNAL MEDICINE

## 2019-09-20 PROCEDURE — 0 TECHNETIUM TETROFOSMIN KIT: Performed by: NURSE PRACTITIONER

## 2019-09-20 PROCEDURE — 78452 HT MUSCLE IMAGE SPECT MULT: CPT

## 2019-09-20 PROCEDURE — 93017 CV STRESS TEST TRACING ONLY: CPT

## 2019-09-20 PROCEDURE — 93018 CV STRESS TEST I&R ONLY: CPT | Performed by: INTERNAL MEDICINE

## 2019-09-20 PROCEDURE — A9502 TC99M TETROFOSMIN: HCPCS | Performed by: NURSE PRACTITIONER

## 2019-09-20 PROCEDURE — 93016 CV STRESS TEST SUPVJ ONLY: CPT | Performed by: INTERNAL MEDICINE

## 2019-09-20 PROCEDURE — 93000 ELECTROCARDIOGRAM COMPLETE: CPT | Performed by: INTERNAL MEDICINE

## 2019-09-20 RX ORDER — LISINOPRIL 10 MG/1
10 TABLET ORAL DAILY
Qty: 90 TABLET | Refills: 2 | Status: SHIPPED | OUTPATIENT
Start: 2019-09-20 | End: 2020-07-28

## 2019-09-20 RX ADMIN — TETROFOSMIN 1 DOSE: 1.38 INJECTION, POWDER, LYOPHILIZED, FOR SOLUTION INTRAVENOUS at 08:55

## 2019-09-20 RX ADMIN — TETROFOSMIN 1 DOSE: 1.38 INJECTION, POWDER, LYOPHILIZED, FOR SOLUTION INTRAVENOUS at 08:05

## 2019-09-20 NOTE — PROGRESS NOTES
Date of Office Visit: 2019  Encounter Provider: Stanislav Maurice MD  Place of Service: [facility] CARDIOLOGY  Patient Name: Elias Raza  :1945  [id number]    Chief Complaint   Patient presents with   • Coronary Artery Disease   :     HPI: Elias Raza is a 74 y.o. male Mr. Raza is a gentleman whose had a prior inferior infarct then was found to have severe three-vessel disease had a 6 vessel bypass in  has right bundle branch block last EF in 2016 was normal at 50% he exercises 3 times a week without limitation he does not have chest pain shortness of breath PND orthopnea edema he had a stress test today that looks to me that show his old inferior infarct but no real ischemia has not been officially read    Past Medical History:   Diagnosis Date   • CAD (coronary artery disease)    • Diabetes mellitus (CMS/HCC)    • Hypertension    • Old myocardial infarct        Past Surgical History:   Procedure Laterality Date   • CORONARY ARTERY BYPASS GRAFT  2009:LIMA to LAD, SVG to RI, SVG to OM2, SVG to RAY, SVG to RV and PDA; inflat MI no ischemia on stress in    • TOOTH EXTRACTION         Social History     Socioeconomic History   • Marital status:      Spouse name: Not on file   • Number of children: Not on file   • Years of education: Not on file   • Highest education level: Not on file   Tobacco Use   • Smoking status: Former Smoker   • Smokeless tobacco: Never Used   • Tobacco comment: 20 years ago   Substance and Sexual Activity   • Alcohol use: No   • Drug use: No   • Sexual activity: Defer       Family History   Problem Relation Age of Onset   • No Known Problems Mother    • No Known Problems Father    • No Known Problems Maternal Grandmother    • No Known Problems Maternal Grandfather    • No Known Problems Paternal Grandmother    • No Known Problems Paternal Grandfather        Review of Systems   Constitution: Negative for decreased  appetite, fever, malaise/fatigue and weight loss.   HENT: Negative for nosebleeds.    Eyes: Negative for double vision.   Cardiovascular: Negative for chest pain, claudication, cyanosis, dyspnea on exertion, irregular heartbeat, leg swelling, near-syncope, orthopnea, palpitations, paroxysmal nocturnal dyspnea and syncope.   Respiratory: Negative for cough, hemoptysis and shortness of breath.    Hematologic/Lymphatic: Negative for bleeding problem.   Skin: Negative for rash.   Musculoskeletal: Negative for falls and myalgias.   Gastrointestinal: Negative for hematochezia, jaundice, melena, nausea and vomiting.   Genitourinary: Negative for hematuria.   Neurological: Negative for dizziness and seizures.   Psychiatric/Behavioral: Negative for altered mental status and memory loss.       No Known Allergies      Current Outpatient Medications:   •  ACCU-CHEK FASTCLIX LANCETS [facility], Check blood sugar daily fasting in a.m., Disp: , Rfl:   •  aspirin 81 MG tablet, Take 81 mg by mouth Daily., Disp: , Rfl:   •  azelastine (ASTELIN) 0.1 % nasal spray, 2 sprays into each nostril Daily., Disp: , Rfl:   •  fluticasone (FLONASE) 50 MCG/ACT nasal spray, 2 sprays into each nostril Daily., Disp: , Rfl:   •  glucose blood (ONETOUCH VERIO) test strip, 1 strip by Other route., Disp: , Rfl:   •  lisinopril (PRINIVIL,ZESTRIL) 10 MG tablet, Take 1 tablet by mouth Daily., Disp: 90 tablet, Rfl: 3  •  METFORMIN HCL ER PO, Take 1,000 mg by mouth 2 (two) times a day., Disp: , Rfl:   •  metoprolol tartrate (LOPRESSOR) 25 MG tablet, TAKE 1 TABLET BY MOUTH 2 (TWO) TIMES A DAY., Disp: 180 tablet, Rfl: 2  •  Multiple Vitamin tablet, Take 1 tablet by mouth Daily., Disp: , Rfl:   •  rosuvastatin (CRESTOR) 10 MG tablet, Take 10 mg by mouth Daily., Disp: , Rfl:   •  azelastine (ASTELIN) 0.1 % nasal spray, INSTILL 2 SPRAYS INTO EACH NOSTRIL TWICE A DAY, Disp: , Rfl:   •  azithromycin (ZITHROMAX Z-FLAQUITA) 250 MG tablet, Take 2 tablets the first day, then 1  "tablet daily for 4 days., Disp: 6 tablet, Rfl: 0  •  benzonatate (TESSALON) 200 MG capsule, Take 1 capsule by mouth 3 (Three) Times a Day As Needed for Cough., Disp: 30 capsule, Rfl: 0  •  glipiZIDE (GLUCOTROL XL) 5 MG ER tablet, Take 5 mg by mouth., Disp: , Rfl:   •  glipiZIDE (GLUCOTROL) 5 MG ER tablet, Take 5 mg by mouth Daily., Disp: , Rfl:   •  metFORMIN (GLUCOPHAGE) 1000 MG tablet, , Disp: , Rfl:   •  metFORMIN (GLUCOPHAGE) 1000 MG tablet, Take 1,000 mg by mouth., Disp: , Rfl:   •  rosuvastatin (CRESTOR) 10 MG tablet, TAKE 1 TABLET BY MOUTH DAILY, Disp: , Rfl:   No current facility-administered medications for this visit.       Objective:     Vitals:    09/20/19 1032   BP: 130/82   Pulse: 74   Weight: 86.4 kg (190 lb 6.4 oz)   Height: 185.4 cm (73\")     Body mass index is 25.12 kg/m².    Physical Exam   Constitutional: He is oriented to person, place, and time. He appears well-developed and well-nourished.   HENT:   Head: Normocephalic.   Eyes: No scleral icterus.   Neck: No JVD present. No thyromegaly present.   Cardiovascular: Normal rate, regular rhythm and normal heart sounds. Exam reveals no gallop and no friction rub.   No murmur heard.  Pulmonary/Chest: Effort normal and breath sounds normal. He has no wheezes. He has no rales.       Abdominal: Soft. There is no hepatosplenomegaly. There is no tenderness.   Musculoskeletal: Normal range of motion. He exhibits no edema.   Lymphadenopathy:     He has no cervical adenopathy.   Neurological: He is alert and oriented to person, place, and time.   Skin: Skin is warm and dry. No rash noted.   Psychiatric: He has a normal mood and affect.         ECG 12 Lead  Date/Time: 9/20/2019 11:32 AM  Performed by: Stanislav Maurice MD  Authorized by: Stanislav Maurice MD   Comparison: compared with previous ECG   Similar to previous ECG  Rhythm: sinus rhythm  Conduction: right bundle branch block  Q waves: II, III and aVF      Clinical impression: abnormal EKG           "   Assessment:       Diagnosis Plan   1. Old inferior wall myocardial infarction     2. RBBB (right bundle branch block)     3. Coronary artery disease involving native coronary artery of native heart without angina pectoris     4. Hyperlipidemia, unspecified hyperlipidemia type     5. Essential hypertension     6. Diabetic nephropathy associated with type 2 diabetes mellitus (CMS/[facility])     7. S/P CABG (coronary artery bypass graft)            Plan:       He is doing very well is asymptomatic he is on good medical therapy which includes statin, aspirin, ACE inhibitor, beta-blockade and treatment of his diabetes.  He is asymptomatic his stress today shows mainly infarction not a lot of ischemia and we are going to continue to watch him will consider re-stressing in 3 to 5 years I think his risk modification is pretty good.  He will come back and check with Rosa next year and see me until    As always, it has been a pleasure to participate in your patient's care.      Sincerely,       Stanislav Maurice MD

## 2020-07-28 RX ORDER — LISINOPRIL 10 MG/1
TABLET ORAL
Qty: 90 TABLET | Refills: 2 | Status: SHIPPED | OUTPATIENT
Start: 2020-07-28 | End: 2021-05-05

## 2020-09-22 ENCOUNTER — OFFICE VISIT (OUTPATIENT)
Dept: CARDIOLOGY | Facility: CLINIC | Age: 75
End: 2020-09-22

## 2020-09-22 VITALS
HEART RATE: 82 BPM | WEIGHT: 193.2 LBS | HEIGHT: 72 IN | RESPIRATION RATE: 18 BRPM | SYSTOLIC BLOOD PRESSURE: 136 MMHG | OXYGEN SATURATION: 96 % | BODY MASS INDEX: 26.17 KG/M2 | DIASTOLIC BLOOD PRESSURE: 76 MMHG

## 2020-09-22 DIAGNOSIS — I10 ESSENTIAL HYPERTENSION: ICD-10-CM

## 2020-09-22 DIAGNOSIS — E78.5 HYPERLIPIDEMIA, UNSPECIFIED HYPERLIPIDEMIA TYPE: ICD-10-CM

## 2020-09-22 DIAGNOSIS — I25.10 CORONARY ARTERY DISEASE INVOLVING NATIVE CORONARY ARTERY OF NATIVE HEART WITHOUT ANGINA PECTORIS: Primary | ICD-10-CM

## 2020-09-22 PROCEDURE — 93000 ELECTROCARDIOGRAM COMPLETE: CPT | Performed by: NURSE PRACTITIONER

## 2020-09-22 PROCEDURE — 99214 OFFICE O/P EST MOD 30 MIN: CPT | Performed by: NURSE PRACTITIONER

## 2020-09-22 NOTE — PROGRESS NOTES
Date of Office Visit: 2020  Encounter Provider: ARLIN Vang  Place of Service: [facility] CARDIOLOGY  Patient Name: Elias Raza  :1945    Chief Complaint   Patient presents with   • Coronary Artery Disease     1 YR FOLLOW UP   :     HPI: Elias Raza is a 75 y.o. male, new to me, who presents today for follow-up.  Old records have been obtained and reviewed by me.  He is a patient of Dr. Maurice's with a past cardiac history significant for hypertension, right bundle branch block, and coronary artery disease.  In , he underwent a CABG x 6.  He was last seen in the office by Dr. Maurice on 2019 at which time he was doing well with no complaints of angina or heart failure.  He also had a stress test that day which showed mainly infarction in the basal and mid inferolateral walls as well as the distal lateral wall.  There was no ischemia, so medical therapy was recommended with consideration of repeating a stress test in 3 to 5 years.  He was advised to follow-up in 1 year.   Overall he has been doing well cardiac standpoint.  He denies any angina.  He has occasional shooting pains every once in a while that are random in occurrence and short in duration.  His shortness of air comes and goes with exertion.  He denies any PND orthopnea.  He has occasional dizziness.  He had an episode 6 or 8 months ago where he got up at night too fast and subsequently became very lightheaded and had to sit down on the floor to prevent from passing out.  He has not been exercising regularly since the COVID pandemic.  He hates working out at home.  He denies any palpitations, edema, syncope.    Past Medical History:   Diagnosis Date   • CAD (coronary artery disease)    • Diabetes mellitus (CMS/[facility])    • Hypertension    • Old myocardial infarct        Past Surgical History:   Procedure Laterality Date   • CORONARY ARTERY BYPASS GRAFT  2009:LIMA to LAD, SVG to RI,  SVG to OM2, SVG to RAY, SVG to RV and PDA; inflat MI no ischemia on stress in 2015   • TOOTH EXTRACTION         Social History     Socioeconomic History   • Marital status:      Spouse name: Not on file   • Number of children: Not on file   • Years of education: Not on file   • Highest education level: Not on file   Tobacco Use   • Smoking status: Former Smoker   • Smokeless tobacco: Never Used   • Tobacco comment: CAFFEINE USE: 2 CUPS COFFEE DAILY   Substance and Sexual Activity   • Alcohol use: No   • Drug use: No   • Sexual activity: Defer       Family History   Problem Relation Age of Onset   • No Known Problems Mother    • No Known Problems Father    • No Known Problems Maternal Grandmother    • No Known Problems Maternal Grandfather    • No Known Problems Paternal Grandmother    • No Known Problems Paternal Grandfather        Review of Systems   Constitution: Negative for chills, fever and malaise/fatigue.   Cardiovascular: Positive for dyspnea on exertion. Negative for chest pain, leg swelling, near-syncope, orthopnea, palpitations, paroxysmal nocturnal dyspnea and syncope.   Respiratory: Negative for cough and shortness of breath.    Musculoskeletal: Negative for joint pain, joint swelling and myalgias.   Gastrointestinal: Negative for abdominal pain, diarrhea, melena, nausea and vomiting.   Genitourinary: Negative for frequency and hematuria.   Neurological: Positive for light-headedness. Negative for numbness, paresthesias and seizures.   Allergic/Immunologic: Negative.    All other systems reviewed and are negative.      No Known Allergies      Current Outpatient Medications:   •  ACCU-CHEK FASTCLIX LANCETS [facility], Check blood sugar daily fasting in a.m., Disp: , Rfl:   •  aspirin 81 MG tablet, Take 81 mg by mouth Daily., Disp: , Rfl:   •  azelastine (ASTELIN) 0.1 % nasal spray, 2 sprays into each nostril Daily., Disp: , Rfl:   •  benzonatate (TESSALON) 200 MG capsule, Take 1 capsule by mouth 3  "(Three) Times a Day As Needed for Cough., Disp: 30 capsule, Rfl: 0  •  fluticasone (FLONASE) 50 MCG/ACT nasal spray, 2 sprays into each nostril Daily., Disp: , Rfl:   •  glipiZIDE (GLUCOTROL) 5 MG ER tablet, Take 2.5 mg by mouth Daily., Disp: , Rfl:   •  glucose blood (ONETOUCH VERIO) test strip, 1 strip by Other route., Disp: , Rfl:   •  lisinopril (PRINIVIL,ZESTRIL) 10 MG tablet, TAKE 1 TABLET BY MOUTH EVERY DAY, Disp: 90 tablet, Rfl: 2  •  METFORMIN HCL ER PO, Take 1,000 mg by mouth 2 (two) times a day., Disp: , Rfl:   •  metoprolol tartrate (LOPRESSOR) 25 MG tablet, TAKE 1 TABLET BY MOUTH TWICE A DAY, Disp: 180 tablet, Rfl: 2  •  Multiple Vitamin tablet, Take 1 tablet by mouth Daily., Disp: , Rfl:   •  rosuvastatin (CRESTOR) 10 MG tablet, TAKE 1 TABLET BY MOUTH DAILY, Disp: , Rfl:   •  glipiZIDE (GLUCOTROL XL) 5 MG ER tablet, Take 5 mg by mouth., Disp: , Rfl:   •  metFORMIN (GLUCOPHAGE) 1000 MG tablet, Take 1,000 mg by mouth., Disp: , Rfl:       Objective:     Vitals:    09/22/20 1103 09/22/20 1111   BP: 136/76 136/76   BP Location: Right arm Left arm   Patient Position: Sitting Sitting   Pulse: 82    Resp: 18    SpO2: 96%    Weight: 87.6 kg (193 lb 3.2 oz)    Height: 182.9 cm (72\")      Body mass index is 26.2 kg/m².    PHYSICAL EXAM:    Constitutional:       General: Not in acute distress.     Appearance: Well-developed. Not diaphoretic.   Eyes:      Pupils: Pupils are equal, round, and reactive to light.   HENT:      Head: Normocephalic and atraumatic.   Neck:      Thyroid: No thyromegaly.      Vascular: No JVD.   Pulmonary:      Effort: Pulmonary effort is normal. No respiratory distress.      Breath sounds: Normal breath sounds.   Cardiovascular:      Normal rate. Regular rhythm.   Pulses:     Intact distal pulses.   Abdominal:      General: Bowel sounds are normal. There is no distension.      Palpations: Abdomen is soft. There is no hepatomegaly or splenomegaly.      Tenderness: There is no abdominal " tenderness.   Musculoskeletal: Normal range of motion.   Skin:     General: Skin is warm and dry.      Findings: No erythema.   Neurological:      Mental Status: Alert and oriented to person, place, and time.   Psychiatric:         Behavior: Behavior normal.         Judgment: Judgment normal.           ECG 12 Lead    Date/Time: 9/22/2020 11:17 AM  Performed by: Rosa Liao APRN  Authorized by: Rosa Liao APRN   Comparison: compared with previous ECG from 9/20/2019  Similar to previous ECG  Rhythm: sinus rhythm  Rate: normal  BPM: 77  Conduction: right bundle branch block  Q waves: III      Clinical impression: abnormal EKG  Comments: Indication: CAD              Assessment:       Diagnosis Plan   1. Coronary artery disease involving native coronary artery of native heart without angina pectoris  ECG 12 Lead   2. Essential hypertension     3. Hyperlipidemia, unspecified hyperlipidemia type       Orders Placed This Encounter   Procedures   • ECG 12 Lead     This order was created via procedure documentation          Plan:       1.  Coronary artery disease.  He denies any symptoms of angina.  His EKG is stable and unchanged.        2.  Hypertension.  His blood pressure is stable today.      3.  Hyperlipidemia.  Lipid panel from June revealed an LDL of 59 and an HDL of 35.  Continue Crestor at current dose.      Overall think he is stable and doing well.  He denies any symptoms of angina or heart failure.  I am not going to make any changes, and he will follow-up with Dr. Maurice in 1 year or sooner if needed.      As always, it has been a pleasure to participate in your patient's care.      Sincerely,         ARLIN Nolan

## 2020-10-28 ENCOUNTER — TRANSCRIBE ORDERS (OUTPATIENT)
Dept: ADMINISTRATIVE | Facility: HOSPITAL | Age: 75
End: 2020-10-28

## 2020-10-28 DIAGNOSIS — E11.21 TYPE II DIABETES MELLITUS WITH NEPHROPATHY (HCC): Primary | ICD-10-CM

## 2020-11-10 ENCOUNTER — HOSPITAL ENCOUNTER (OUTPATIENT)
Dept: CARDIOLOGY | Facility: HOSPITAL | Age: 75
Discharge: HOME | End: 2020-11-10
Admitting: PODIATRIST

## 2020-11-10 DIAGNOSIS — E11.21 TYPE II DIABETES MELLITUS WITH NEPHROPATHY (HCC): ICD-10-CM

## 2020-11-10 LAB
BH CV LOWER ARTERIAL LEFT ABI RATIO: 1.1
BH CV LOWER ARTERIAL LEFT DORSALIS PEDIS SYS MAX: 133 MMHG
BH CV LOWER ARTERIAL LEFT GREAT TOE SYS MAX: 133 MMHG
BH CV LOWER ARTERIAL LEFT POST TIBIAL SYS MAX: 136 MMHG
BH CV LOWER ARTERIAL LEFT TBI RATIO: 0.98
BH CV LOWER ARTERIAL RIGHT ABI RATIO: 1
BH CV LOWER ARTERIAL RIGHT DORSALIS PEDIS SYS MAX: 139 MMHG
BH CV LOWER ARTERIAL RIGHT GREAT TOE SYS MAX: 111 MMHG
BH CV LOWER ARTERIAL RIGHT POST TIBIAL SYS MAX: 135 MMHG
BH CV LOWER ARTERIAL RIGHT TBI RATIO: 0.82
UPPER ARTERIAL LEFT ARM BRACHIAL SYS MAX: 123 MMHG
UPPER ARTERIAL RIGHT ARM BRACHIAL SYS MAX: 136 MMHG

## 2020-11-10 PROCEDURE — 93923 UPR/LXTR ART STDY 3+ LVLS: CPT

## 2020-11-10 PROCEDURE — 93922 UPR/L XTREMITY ART 2 LEVELS: CPT

## 2021-03-09 DIAGNOSIS — Z23 IMMUNIZATION DUE: ICD-10-CM

## 2021-05-05 RX ORDER — LISINOPRIL 10 MG/1
TABLET ORAL
Qty: 90 TABLET | Refills: 2 | Status: SHIPPED | OUTPATIENT
Start: 2021-05-05 | End: 2021-12-14 | Stop reason: SDUPTHER

## 2021-09-22 ENCOUNTER — OFFICE VISIT (OUTPATIENT)
Dept: CARDIOLOGY | Facility: CLINIC | Age: 76
End: 2021-09-22

## 2021-09-22 VITALS
BODY MASS INDEX: 25.21 KG/M2 | DIASTOLIC BLOOD PRESSURE: 84 MMHG | WEIGHT: 190.2 LBS | SYSTOLIC BLOOD PRESSURE: 138 MMHG | HEART RATE: 80 BPM | HEIGHT: 73 IN

## 2021-09-22 DIAGNOSIS — I49.3 PVC (PREMATURE VENTRICULAR CONTRACTION): ICD-10-CM

## 2021-09-22 DIAGNOSIS — Z95.1 S/P CABG (CORONARY ARTERY BYPASS GRAFT): Primary | ICD-10-CM

## 2021-09-22 DIAGNOSIS — E78.5 HYPERLIPIDEMIA, UNSPECIFIED HYPERLIPIDEMIA TYPE: ICD-10-CM

## 2021-09-22 PROCEDURE — 99214 OFFICE O/P EST MOD 30 MIN: CPT | Performed by: INTERNAL MEDICINE

## 2021-09-22 PROCEDURE — 93000 ELECTROCARDIOGRAM COMPLETE: CPT | Performed by: INTERNAL MEDICINE

## 2021-09-22 NOTE — PROGRESS NOTES
Date of Office Visit: 21  Encounter Provider: Stanislav Maurice MD  Place of Service: [facility] CARDIOLOGY  Patient Name: Elias Raza  :1945  [id number]    Chief Complaint   Patient presents with   • Coronary Artery Disease   :     HPI: Elias Raza is a 76 y.o. male a prior inferior infarct then was found to have severe three-vessel disease had a 6 vessel bypass in  has right bundle branch block last EF in 2016 was normal at 50%. Last time I saw him was in 2019, at which time he had a stress test which showed infarct in the basal and mid inferolateral walls as well as the distal lateral wall without ischemia.    He presents today for routine follow-up. He has been doing well from a cardiac standpoint. He denies chest pain, dyspnea, palpitations, PND, orthopnea, or episodes of syncope. He is back to his normal exercise routine, which is combination of cardio and weights at the gym 3 times a week. He reports no issues with this.    He continues to follow-up with his PCP, Dr. Del Rio, in relation to his diabetes. He reports reports that he has his A1c checked every 3 months. The last one a few weeks ago was 6.7. He also just received his Covid booster.      Past Medical History:   Diagnosis Date   • CAD (coronary artery disease)    • Diabetes mellitus (CMS/[facility])    • Hypertension    • Old myocardial infarct        Past Surgical History:   Procedure Laterality Date   • CORONARY ARTERY BYPASS GRAFT  2009:LIMA to LAD, SVG to RI, SVG to OM2, SVG to RAY, SVG to RV and PDA; inflat MI no ischemia on stress in    • TOOTH EXTRACTION         Social History     Socioeconomic History   • Marital status:      Spouse name: Not on file   • Number of children: Not on file   • Years of education: Not on file   • Highest education level: Not on file   Tobacco Use   • Smoking status: Former Smoker   • Smokeless tobacco: Never Used   • Tobacco comment: CAFFEINE USE: 2  CUPS COFFEE DAILY   Substance and Sexual Activity   • Alcohol use: No   • Drug use: No   • Sexual activity: Defer       Family History   Problem Relation Age of Onset   • No Known Problems Mother    • No Known Problems Father    • No Known Problems Maternal Grandmother    • No Known Problems Maternal Grandfather    • No Known Problems Paternal Grandmother    • No Known Problems Paternal Grandfather        Review of Systems   Constitutional: Negative for decreased appetite, fever, malaise/fatigue and weight loss.   HENT: Negative for nosebleeds.    Eyes: Negative for double vision.   Cardiovascular: Negative for chest pain, claudication, cyanosis, dyspnea on exertion, irregular heartbeat, leg swelling, near-syncope, orthopnea, palpitations, paroxysmal nocturnal dyspnea and syncope.   Respiratory: Negative for cough, hemoptysis and shortness of breath.    Hematologic/Lymphatic: Negative for bleeding problem.   Skin: Negative for rash.   Musculoskeletal: Negative for falls and myalgias.   Gastrointestinal: Negative for hematochezia, jaundice, melena, nausea and vomiting.   Genitourinary: Negative for hematuria.   Neurological: Negative for dizziness and seizures.   Psychiatric/Behavioral: Negative for altered mental status and memory loss.       No Known Allergies      Current Outpatient Medications:   •  ACCU-CHEK FASTCLIX LANCETS [facility], Check blood sugar daily fasting in a.m., Disp: , Rfl:   •  aspirin 81 MG tablet, Take 81 mg by mouth Daily., Disp: , Rfl:   •  azelastine (ASTELIN) 0.1 % nasal spray, 2 sprays into each nostril Daily., Disp: , Rfl:   •  fluticasone (FLONASE) 50 MCG/ACT nasal spray, 2 sprays into each nostril Daily., Disp: , Rfl:   •  glipiZIDE (GLUCOTROL) 5 MG ER tablet, Take 2.5 mg by mouth Daily., Disp: , Rfl:   •  glucose blood (ONETOUCH VERIO) test strip, 1 strip by Other route., Disp: , Rfl:   •  lisinopril (PRINIVIL,ZESTRIL) 10 MG tablet, TAKE 1 TABLET BY MOUTH EVERY DAY, Disp: 90 tablet, Rfl:  "2  •  METFORMIN HCL ER PO, Take 1,000 mg by mouth 2 (two) times a day., Disp: , Rfl:   •  metoprolol tartrate (LOPRESSOR) 25 MG tablet, TAKE 1 TABLET BY MOUTH TWICE A DAY, Disp: 180 tablet, Rfl: 2  •  Multiple Vitamin tablet, Take 1 tablet by mouth Daily., Disp: , Rfl:   •  rosuvastatin (CRESTOR) 10 MG tablet, TAKE 1 TABLET BY MOUTH DAILY, Disp: , Rfl:   •  benzonatate (TESSALON) 200 MG capsule, Take 1 capsule by mouth 3 (Three) Times a Day As Needed for Cough., Disp: 30 capsule, Rfl: 0  •  glipiZIDE (GLUCOTROL XL) 5 MG ER tablet, Take 5 mg by mouth., Disp: , Rfl:   •  metFORMIN (GLUCOPHAGE) 1000 MG tablet, Take 1,000 mg by mouth., Disp: , Rfl:       Objective:     Vitals:    09/22/21 1042   BP: 138/84   Pulse: 80   Weight: 86.3 kg (190 lb 3.2 oz)   Height: 185.4 cm (73\")     Body mass index is 25.09 kg/m².    Constitutional:       Appearance: Well-developed.   Eyes:      General: No scleral icterus.  HENT:      Head: Normocephalic.   Neck:      Thyroid: No thyromegaly.      Vascular: No JVD.      Lymphadenopathy: No cervical adenopathy.   Pulmonary:      Effort: Pulmonary effort is normal.      Breath sounds: Normal breath sounds. No wheezing. No rales.   Cardiovascular:      Normal rate. Regular rhythm.      No gallop.   Edema:     Peripheral edema absent.   Abdominal:      Palpations: Abdomen is soft.      Tenderness: There is no abdominal tenderness.   Musculoskeletal: Normal range of motion. Skin:     General: Skin is warm and dry.      Findings: No rash.   Neurological:      Mental Status: Alert and oriented to person, place, and time.           ECG 12 Lead    Date/Time: 9/22/2021 11:32 AM  Performed by: Stanislav Maurice MD  Authorized by: Stanislav Maurice MD   Comparison: compared with previous ECG   Similar to previous ECG  Rhythm: sinus rhythm  Ectopy: unifocal PVCs  Conduction: right bundle branch block    Clinical impression: abnormal EKG             Assessment:       Diagnosis Plan   1. S/P CABG " (coronary artery bypass graft)     2. Hyperlipidemia, unspecified hyperlipidemia type     3. PVC (premature ventricular contraction)            Plan:       1-2: SP CABG, HLD -- doing well from a cardiac standpoint, and on good medical therapy, we will not change anything today. I did ask him to ask his PCP, Dr. Del Rio, who manages his diabetes, for his opinion about the addition of Jardiance. He is going to do this at his next appointment.    3: PVCs --stable, with no complaints of palpitations or syncope.    Follow-up with Rosa in 1 year, and me in 2 years.    As always, it has been a pleasure to participate in your patient's care.      Sincerely,     YASMEEN Castro MD

## 2021-12-14 RX ORDER — LISINOPRIL 10 MG/1
10 TABLET ORAL DAILY
Qty: 90 TABLET | Refills: 2 | Status: SHIPPED | OUTPATIENT
Start: 2021-12-14 | End: 2022-02-02

## 2022-02-02 RX ORDER — LISINOPRIL 10 MG/1
TABLET ORAL
Qty: 90 TABLET | Refills: 2 | Status: SHIPPED | OUTPATIENT
Start: 2022-02-02 | End: 2022-08-29

## 2022-08-29 RX ORDER — LISINOPRIL 10 MG/1
TABLET ORAL
Qty: 90 TABLET | Refills: 3 | Status: SHIPPED | OUTPATIENT
Start: 2022-08-29

## 2022-09-27 ENCOUNTER — OFFICE VISIT (OUTPATIENT)
Dept: CARDIOLOGY | Facility: CLINIC | Age: 77
End: 2022-09-27

## 2022-09-27 VITALS
WEIGHT: 194.6 LBS | HEART RATE: 78 BPM | BODY MASS INDEX: 25.79 KG/M2 | SYSTOLIC BLOOD PRESSURE: 110 MMHG | HEIGHT: 73 IN | OXYGEN SATURATION: 97 % | DIASTOLIC BLOOD PRESSURE: 80 MMHG

## 2022-09-27 DIAGNOSIS — I10 ESSENTIAL HYPERTENSION: ICD-10-CM

## 2022-09-27 DIAGNOSIS — I25.10 CORONARY ARTERY DISEASE INVOLVING NATIVE CORONARY ARTERY OF NATIVE HEART WITHOUT ANGINA PECTORIS: Primary | ICD-10-CM

## 2022-09-27 DIAGNOSIS — Z95.1 S/P CABG (CORONARY ARTERY BYPASS GRAFT): ICD-10-CM

## 2022-09-27 DIAGNOSIS — E78.5 HYPERLIPIDEMIA, UNSPECIFIED HYPERLIPIDEMIA TYPE: ICD-10-CM

## 2022-09-27 PROCEDURE — 99214 OFFICE O/P EST MOD 30 MIN: CPT | Performed by: NURSE PRACTITIONER

## 2022-09-27 PROCEDURE — 93000 ELECTROCARDIOGRAM COMPLETE: CPT | Performed by: NURSE PRACTITIONER

## 2022-09-27 NOTE — PROGRESS NOTES
"  Date of Office Visit: 2022  Encounter Provider: ARLIN Lemons  Place of Service: [facility] CARDIOLOGY  Patient Name: Elias Raza  :1945    Chief Complaint   Patient presents with   • Coronary Artery Disease   :     HPI: Elias Raza is a 77 y.o. male.  He is a patient of Dr. Maurice's whom we follow for the management of hypertension, right bundle branch block, and coronary artery disease.  In , he underwent a CABG x 6.   He was last seen in the office by Dr. Maurice in 2021 at which time he was doing well.  He denied any symptoms of angina or heart failure.  No changes were made to his regimen, and he was advised to follow-up in 1 year.   Overall he is doing well.  He denies any chest pain, shortness of breath, palpitations, edema, dizziness, or syncope.  He does report occasional \"shooting pains\" across his chest lasting for only seconds.  These occur randomly and are not associated with exertion.  He does report fatigue which he attributes to his age and deconditioning.  Prior to the pandemic he was exercising regularly.  Admittedly he does not do much these days.    Past Medical History:   Diagnosis Date   • CAD (coronary artery disease)    • Diabetes mellitus (HCC)    • Hypertension    • Old myocardial infarct        Past Surgical History:   Procedure Laterality Date   • CORONARY ARTERY BYPASS GRAFT  2009:LIMA to LAD, SVG to RI, SVG to OM2, SVG to RAY, SVG to RV and PDA; inflat MI no ischemia on stress in    • TOOTH EXTRACTION         Social History     Socioeconomic History   • Marital status:    Tobacco Use   • Smoking status: Former Smoker   • Smokeless tobacco: Never Used   • Tobacco comment: CAFFEINE USE: 2 CUPS COFFEE DAILY   Substance and Sexual Activity   • Alcohol use: No   • Drug use: No   • Sexual activity: Defer       Family History   Problem Relation Age of Onset   • No Known Problems Mother    • No Known " "Problems Father    • No Known Problems Maternal Grandmother    • No Known Problems Maternal Grandfather    • No Known Problems Paternal Grandmother    • No Known Problems Paternal Grandfather        Review of Systems   Constitutional: Positive for malaise/fatigue.   Cardiovascular: Negative.  Negative for chest pain, dyspnea on exertion, leg swelling, orthopnea, paroxysmal nocturnal dyspnea and syncope.   Respiratory: Negative.    Hematologic/Lymphatic: Negative for bleeding problem.   Musculoskeletal: Positive for arthritis. Negative for falls.   Gastrointestinal: Negative for melena.   Neurological: Negative for dizziness and light-headedness.       No Known Allergies      Current Outpatient Medications:   •  ACCU-CHEK FASTCLIX LANCETS [facility], Check blood sugar daily fasting in a.m., Disp: , Rfl:   •  aspirin 81 MG tablet, Take 81 mg by mouth Daily., Disp: , Rfl:   •  azelastine (ASTELIN) 0.1 % nasal spray, 2 sprays into each nostril Daily., Disp: , Rfl:   •  fluticasone (FLONASE) 50 MCG/ACT nasal spray, 2 sprays into each nostril Daily., Disp: , Rfl:   •  glipiZIDE (GLUCOTROL XL) 5 MG ER tablet, Take 5 mg by mouth., Disp: , Rfl:   •  glucose blood test strip, 1 strip by Other route., Disp: , Rfl:   •  lisinopril (PRINIVIL,ZESTRIL) 10 MG tablet, TAKE 1 TABLET DAILY, Disp: 90 tablet, Rfl: 3  •  METFORMIN HCL ER PO, Take 1,000 mg by mouth 2 (two) times a day., Disp: , Rfl:   •  metoprolol tartrate (LOPRESSOR) 25 MG tablet, TAKE 1 TABLET TWICE A DAY, Disp: 180 tablet, Rfl: 3  •  Multiple Vitamin tablet, Take 1 tablet by mouth Daily., Disp: , Rfl:   •  rosuvastatin (CRESTOR) 10 MG tablet, TAKE 1 TABLET BY MOUTH DAILY, Disp: , Rfl:       Objective:     Vitals:    09/27/22 0916   BP: 110/80   BP Location: Left arm   Patient Position: Sitting   Pulse: 78   SpO2: 97%   Weight: 88.3 kg (194 lb 9.6 oz)   Height: 185.4 cm (73\")     Body mass index is 25.67 kg/m².    PHYSICAL EXAM:    Neck:      Vascular: No JVD.   Pulmonary: "      Effort: Pulmonary effort is normal.      Breath sounds: Normal breath sounds.   Cardiovascular:      Normal rate. Regular rhythm.      Murmurs: There is no murmur.      No gallop. No click. No rub.   Pulses:     Intact distal pulses.           ECG 12 Lead    Date/Time: 9/27/2022 9:29 AM  Performed by: Rosa Liao APRN  Authorized by: Rosa Liao APRN   Comparison: compared with previous ECG from 9/22/2021  Similar to previous ECG  Rhythm: sinus rhythm  Ectopy: unifocal PVCs  Rate: normal  BPM: 78  Conduction: right bundle branch block              Assessment:       Diagnosis Plan   1. Coronary artery disease involving native coronary artery of native heart without angina pectoris     2. S/P CABG (coronary artery bypass graft)     3. Essential hypertension     4. Hyperlipidemia, unspecified hyperlipidemia type       Orders Placed This Encounter   Procedures   • ECG 12 Lead     This order was created via procedure documentation     Order Specific Question:   Release to patient     Answer:   Routine Release          Plan:       1.  Coronary artery disease.  History of CABG x 6.  He denies any symptoms of angina.  Continue aspirin and rosuvastatin.      2.  Hypertension.  His blood pressure looks great.  Continue lisinopril.      3.  Hyperlipidemia.  Lipid panel from last week demonstrated an LDL of 57 and an HDL of 39.  Continue rosuvastatin.      ID is doing well.  I honestly feel the fatigue is related to lack of activity, and I encouraged him to resume regular exercise.  Otherwise, I am not making any changes.  He will follow-up with Dr. Maurice in 1 year.      As always, it has been a pleasure to participate in your patient's care.      Sincerely,         ARLIN Nolan

## 2023-10-23 ENCOUNTER — OFFICE VISIT (OUTPATIENT)
Dept: CARDIOLOGY | Facility: CLINIC | Age: 78
End: 2023-10-23
Payer: MEDICARE

## 2023-10-23 VITALS
DIASTOLIC BLOOD PRESSURE: 80 MMHG | WEIGHT: 190 LBS | SYSTOLIC BLOOD PRESSURE: 100 MMHG | HEART RATE: 79 BPM | BODY MASS INDEX: 25.07 KG/M2

## 2023-10-23 DIAGNOSIS — I25.10 CORONARY ARTERY DISEASE INVOLVING NATIVE CORONARY ARTERY OF NATIVE HEART WITHOUT ANGINA PECTORIS: Primary | ICD-10-CM

## 2023-10-23 DIAGNOSIS — Z95.1 S/P CABG (CORONARY ARTERY BYPASS GRAFT): ICD-10-CM

## 2023-10-23 DIAGNOSIS — I10 ESSENTIAL HYPERTENSION: ICD-10-CM

## 2023-10-23 PROCEDURE — 93000 ELECTROCARDIOGRAM COMPLETE: CPT | Performed by: NURSE PRACTITIONER

## 2023-10-23 PROCEDURE — 99214 OFFICE O/P EST MOD 30 MIN: CPT | Performed by: NURSE PRACTITIONER

## 2023-10-23 PROCEDURE — 1160F RVW MEDS BY RX/DR IN RCRD: CPT | Performed by: NURSE PRACTITIONER

## 2023-10-23 PROCEDURE — 3079F DIAST BP 80-89 MM HG: CPT | Performed by: NURSE PRACTITIONER

## 2023-10-23 PROCEDURE — 1159F MED LIST DOCD IN RCRD: CPT | Performed by: NURSE PRACTITIONER

## 2023-10-23 PROCEDURE — 3074F SYST BP LT 130 MM HG: CPT | Performed by: NURSE PRACTITIONER

## 2023-10-23 NOTE — PROGRESS NOTES
Date of Office Visit: 10/23/2023  Encounter Provider: ARLIN Lemons  Place of Service: [facility] CARDIOLOGY  Patient Name: Elias Raza  :1945    Chief Complaint   Patient presents with    Coronary Artery Disease   :     HPI: Elias Raza is an 78 y.o. male patient of Dr. Maurice's whom we follow for the management of hypertension and coronary artery disease.  In , he underwent a CABG x 6.    I last saw him in the office in 2022 at which time he was doing well.  No changes were made to his regimen, he was advised to follow-up in 1 year.    Overall, he has been doing well.  He denies any chest pain, shortness of breath, palpitations, edema, dizziness, or syncope.  A couple nights a week, he wakes at night with pain in his back for which he will take a full-strength aspirin.    Past Medical History:   Diagnosis Date    CAD (coronary artery disease)     Diabetes mellitus     Hypertension     Old myocardial infarct        Past Surgical History:   Procedure Laterality Date    CORONARY ARTERY BYPASS GRAFT  2009:LIMA to LAD, SVG to RI, SVG to OM2, SVG to RAY, SVG to RV and PDA; inflat MI no ischemia on stress in     TOOTH EXTRACTION         Social History     Socioeconomic History    Marital status:    Tobacco Use    Smoking status: Former    Smokeless tobacco: Never    Tobacco comments:     CAFFEINE USE: 2 CUPS COFFEE DAILY   Substance and Sexual Activity    Alcohol use: No    Drug use: No    Sexual activity: Defer       Family History   Problem Relation Age of Onset    No Known Problems Mother     No Known Problems Father     No Known Problems Maternal Grandmother     No Known Problems Maternal Grandfather     No Known Problems Paternal Grandmother     No Known Problems Paternal Grandfather        Review of Systems   Constitutional: Negative.   Cardiovascular: Negative.  Negative for chest pain, dyspnea on exertion, leg swelling,  orthopnea, paroxysmal nocturnal dyspnea and syncope.   Respiratory: Negative.     Hematologic/Lymphatic: Negative for bleeding problem.   Musculoskeletal:  Positive for back pain. Negative for falls.   Gastrointestinal:  Negative for melena.   Neurological:  Negative for dizziness and light-headedness.       No Known Allergies      Current Outpatient Medications:     ACCU-CHEK FASTCLIX LANCETS [facility] [place], Check blood sugar daily fasting in a.m., Disp: , Rfl:     aspirin 81 MG tablet, Take 81 mg by mouth Daily., Disp: , Rfl:     fluticasone (FLONASE) 50 MCG/ACT nasal spray, 2 sprays into each nostril Daily., Disp: , Rfl:     glipiZIDE (GLUCOTROL XL) 5 MG ER tablet, Take 5 mg by mouth., Disp: , Rfl:     glucose blood test strip, 1 strip by Other route., Disp: , Rfl:     lisinopril (PRINIVIL,ZESTRIL) 10 MG tablet, TAKE 1 TABLET DAILY, Disp: 90 tablet, Rfl: 3    METFORMIN HCL ER PO, Take 1,000 mg by mouth 2 (two) times a day., Disp: , Rfl:     metoprolol tartrate (LOPRESSOR) 25 MG tablet, TAKE 1 TABLET TWICE A DAY, Disp: 180 tablet, Rfl: 3    Multiple Vitamin tablet, Take 1 tablet by mouth Daily., Disp: , Rfl:     rosuvastatin (CRESTOR) 10 MG tablet, TAKE 1 TABLET BY MOUTH DAILY, Disp: , Rfl:       Objective:     Vitals:    10/23/23 1122   BP: 100/80   Pulse: 79   Weight: 86.2 kg (190 lb)     Body mass index is 25.07 kg/m².    PHYSICAL EXAM:    Neck:      Vascular: No JVD.   Pulmonary:      Effort: Pulmonary effort is normal.      Breath sounds: Normal breath sounds.   Cardiovascular:      Normal rate. Regular rhythm.      Murmurs: There is no murmur.      No gallop.  No click. No rub.   Pulses:     Intact distal pulses.           ECG 12 Lead    Date/Time: 10/23/2023 11:36 AM  Performed by: Rosa Liao APRN    Authorized by: Rosa Liao APRN  Comparison: compared with previous ECG from 9/27/2022  Similar to previous ECG  Rhythm: sinus rhythm  Rate: normal  BPM: 79  Conduction: right bundle branch block and  left anterior fascicular block            Assessment:       Diagnosis Plan   1. Coronary artery disease involving native coronary artery of native heart without angina pectoris  ECG 12 Lead      2. S/P CABG (coronary artery bypass graft)        3. Essential hypertension          Orders Placed This Encounter   Procedures    ECG 12 Lead     This order was created via procedure documentation     Order Specific Question:   Release to patient     Answer:   Routine Release [[id number]]          Plan:       1.  Coronary artery disease.  History of CABG. he denies any symptoms of angina.  Continue aspirin and rosuvastatin.      2.  Hypertension.  His blood pressure is lowish today.  He has not been checking it regularly at home.  He denies any symptoms of low blood pressure.  Ultimately, I am not making any changes today.  However, we may need to consider cutting back on the lisinopril.      I think he is doing well.  I did recommend he take Tylenol instead of aspirin for his back pain.  Otherwise, I am not making any changes.  He will follow-up with Dr. Maurice in 1 year.      As always, it has been a pleasure to participate in your patient's care.      Sincerely,         ARLIN Nolan

## 2023-11-28 RX ORDER — LISINOPRIL 10 MG/1
10 TABLET ORAL DAILY
Qty: 90 TABLET | Refills: 3 | Status: SHIPPED | OUTPATIENT
Start: 2023-11-28

## 2024-10-02 RX ORDER — LISINOPRIL 10 MG/1
10 TABLET ORAL DAILY
Qty: 90 TABLET | Refills: 3 | Status: SHIPPED | OUTPATIENT
Start: 2024-10-02

## 2024-10-24 ENCOUNTER — OFFICE VISIT (OUTPATIENT)
Dept: CARDIOLOGY | Facility: CLINIC | Age: 79
End: 2024-10-24
Payer: MEDICARE

## 2024-10-24 VITALS
DIASTOLIC BLOOD PRESSURE: 82 MMHG | HEIGHT: 73 IN | BODY MASS INDEX: 25.05 KG/M2 | WEIGHT: 189 LBS | OXYGEN SATURATION: 96 % | SYSTOLIC BLOOD PRESSURE: 130 MMHG | HEART RATE: 50 BPM

## 2024-10-24 DIAGNOSIS — I25.10 CORONARY ARTERY DISEASE INVOLVING NATIVE CORONARY ARTERY OF NATIVE HEART WITHOUT ANGINA PECTORIS: Primary | ICD-10-CM

## 2024-10-24 DIAGNOSIS — I10 ESSENTIAL HYPERTENSION: ICD-10-CM

## 2024-10-24 PROCEDURE — 1159F MED LIST DOCD IN RCRD: CPT | Performed by: NURSE PRACTITIONER

## 2024-10-24 PROCEDURE — 93000 ELECTROCARDIOGRAM COMPLETE: CPT | Performed by: NURSE PRACTITIONER

## 2024-10-24 PROCEDURE — 3075F SYST BP GE 130 - 139MM HG: CPT | Performed by: NURSE PRACTITIONER

## 2024-10-24 PROCEDURE — 1160F RVW MEDS BY RX/DR IN RCRD: CPT | Performed by: NURSE PRACTITIONER

## 2024-10-24 PROCEDURE — 99214 OFFICE O/P EST MOD 30 MIN: CPT | Performed by: NURSE PRACTITIONER

## 2024-10-24 PROCEDURE — 3079F DIAST BP 80-89 MM HG: CPT | Performed by: NURSE PRACTITIONER

## 2024-10-24 NOTE — PROGRESS NOTES
Date of Office Visit: 10/24/2024  Encounter Provider: ARLIN Lemons  Place of Service: [facility] CARDIOLOGY  Patient Name: Elias Raza  :1945    Chief Complaint   Patient presents with    Coronary artery disease involving native coronary artery of   :     HPI: Elias Raza is a 79 y.o. male patient of Dr. Prather with hypertension and coronary artery disease.  In , he underwent a CABG x 6.    I last saw him in the office in 2023 at which time he was doing well up.  No changes were made to his regimen, and he was advised to follow-up in 1 year.    He has been doing well.  He denies any chest pain, shortness of breath, palpitations, edema, dizziness, or syncope.    Past Medical History:   Diagnosis Date    CAD (coronary artery disease)     Diabetes mellitus     Hypertension     Old myocardial infarct        Past Surgical History:   Procedure Laterality Date    CORONARY ARTERY BYPASS GRAFT  2009:LIMA to LAD, SVG to RI, SVG to OM2, SVG to RAY, SVG to RV and PDA; inflat MI no ischemia on stress in     TOOTH EXTRACTION         Social History     Socioeconomic History    Marital status:    Tobacco Use    Smoking status: Former    Smokeless tobacco: Never    Tobacco comments:     CAFFEINE USE: 2 CUPS COFFEE DAILY   Vaping Use    Vaping status: Never Used   Substance and Sexual Activity    Alcohol use: No    Drug use: No    Sexual activity: Defer       Family History   Problem Relation Age of Onset    No Known Problems Mother     No Known Problems Father     No Known Problems Maternal Grandmother     No Known Problems Maternal Grandfather     No Known Problems Paternal Grandmother     No Known Problems Paternal Grandfather        Review of Systems   Constitutional: Negative.   Cardiovascular: Negative.  Negative for chest pain, dyspnea on exertion, leg swelling, orthopnea, paroxysmal nocturnal dyspnea and syncope.   Respiratory: Negative.   "   Hematologic/Lymphatic: Negative for bleeding problem.   Musculoskeletal:  Negative for falls.   Gastrointestinal:  Negative for melena.   Neurological:  Negative for dizziness and light-headedness.       No Known Allergies      Current Outpatient Medications:     ACCU-CHEK FASTCLIX LANCETS [facility] [place], Check blood sugar daily fasting in a.m., Disp: , Rfl:     aspirin 81 MG tablet, Take 1 tablet by mouth Daily., Disp: , Rfl:     fluticasone (FLONASE) 50 MCG/ACT nasal spray, Administer 2 sprays into the nostril(s) as directed by provider Daily., Disp: , Rfl:     glipiZIDE (GLUCOTROL XL) 5 MG ER tablet, Take 1 tablet by mouth., Disp: , Rfl:     glucose blood test strip, 1 each by Other route., Disp: , Rfl:     lisinopril (PRINIVIL,ZESTRIL) 10 MG tablet, TAKE 1 TABLET BY MOUTH DAILY, Disp: 90 tablet, Rfl: 3    METFORMIN HCL ER PO, Take 1,000 mg by mouth 2 (two) times a day., Disp: , Rfl:     metoprolol tartrate (LOPRESSOR) 25 MG tablet, TAKE 1 TABLET BY MOUTH TWICE DAILY, Disp: 180 tablet, Rfl: 3    Multiple Vitamin tablet, Take 1 tablet by mouth Daily., Disp: , Rfl:     rosuvastatin (CRESTOR) 10 MG tablet, TAKE 1 TABLET BY MOUTH DAILY, Disp: , Rfl:       Objective:     Vitals:    10/24/24 0921   BP: 130/82   Pulse: 50   SpO2: 96%   Weight: 85.7 kg (189 lb)   Height: 185.4 cm (73\")     Body mass index is 24.94 kg/m².    PHYSICAL EXAM:    Neck:      Vascular: No JVD.   Pulmonary:      Effort: Pulmonary effort is normal.      Breath sounds: Normal breath sounds.   Cardiovascular:      Normal rate. Regular rhythm.      Murmurs: There is no murmur.      No gallop.  No click. No rub.   Pulses:     Intact distal pulses.           ECG 12 Lead    Date/Time: 10/24/2024 9:35 AM  Performed by: Rosa Liao APRN    Authorized by: Rosa Liao APRN  Comparison: compared with previous ECG from 10/23/2023  Similar to previous ECG  Rhythm: sinus rhythm  Rate: normal  BPM: 69  Conduction: right bundle branch block  Q waves: " III              Assessment:       Diagnosis Plan   1. Coronary artery disease involving native coronary artery of native heart without angina pectoris  ECG 12 Lead      2. Essential hypertension          Orders Placed This Encounter   Procedures    ECG 12 Lead     This order was created via procedure documentation     Order Specific Question:   Release to patient     Answer:   Routine Release [[id number]]          Plan:       1.  Coronary artery disease.  He denies any symptoms of angina.  Continue aspirin and rosuvastatin.      2.  Hypertension.  His blood pressure is stable.  Continue lisinopril.      I think he is doing well.  I am not making any changes, and he will follow-up with Dr. Maurice in 1 year.      As always, it has been a pleasure to participate in your patient's care.      Sincerely,         ARLIN Nolan

## 2025-03-06 RX ORDER — METOPROLOL TARTRATE 25 MG/1
25 TABLET, FILM COATED ORAL 2 TIMES DAILY
Qty: 180 TABLET | Refills: 3 | Status: SHIPPED | OUTPATIENT
Start: 2025-03-06

## 2025-06-18 RX ORDER — LISINOPRIL 10 MG/1
10 TABLET ORAL DAILY
Qty: 90 TABLET | Refills: 3 | Status: SHIPPED | OUTPATIENT
Start: 2025-06-18